# Patient Record
Sex: MALE | Race: WHITE | NOT HISPANIC OR LATINO | Employment: OTHER | URBAN - METROPOLITAN AREA
[De-identification: names, ages, dates, MRNs, and addresses within clinical notes are randomized per-mention and may not be internally consistent; named-entity substitution may affect disease eponyms.]

---

## 2019-03-20 ENCOUNTER — HOSPITAL ENCOUNTER (INPATIENT)
Facility: HOSPITAL | Age: 84
LOS: 6 days | Discharge: HOSPICE | DRG: 640 | End: 2019-03-26
Attending: EMERGENCY MEDICINE | Admitting: INTERNAL MEDICINE
Payer: MEDICARE

## 2019-03-20 DIAGNOSIS — R41.82 ALTERED MENTAL STATUS, UNSPECIFIED ALTERED MENTAL STATUS TYPE: ICD-10-CM

## 2019-03-20 DIAGNOSIS — N28.9 ACUTE ON CHRONIC RENAL INSUFFICIENCY: ICD-10-CM

## 2019-03-20 DIAGNOSIS — N39.0 CHRONIC UTI: ICD-10-CM

## 2019-03-20 DIAGNOSIS — N18.9 ACUTE ON CHRONIC RENAL INSUFFICIENCY: ICD-10-CM

## 2019-03-20 DIAGNOSIS — F01.50 VASCULAR DEMENTIA WITHOUT BEHAVIORAL DISTURBANCE: ICD-10-CM

## 2019-03-20 DIAGNOSIS — E87.0 ACUTE HYPERNATREMIA: Primary | ICD-10-CM

## 2019-03-20 DIAGNOSIS — R41.82 ALTERED MENTAL STATUS: ICD-10-CM

## 2019-03-20 DIAGNOSIS — E87.0 HYPERNATREMIA: ICD-10-CM

## 2019-03-20 DIAGNOSIS — N17.9 AKI (ACUTE KIDNEY INJURY): ICD-10-CM

## 2019-03-20 PROBLEM — D72.829 LEUCOCYTOSIS: Status: ACTIVE | Noted: 2019-03-20

## 2019-03-20 LAB
ALBUMIN SERPL BCP-MCNC: 3 G/DL
ALP SERPL-CCNC: 119 U/L
ALT SERPL W/O P-5'-P-CCNC: 63 U/L
AMORPH CRY URNS QL MICRO: ABNORMAL
ANION GAP SERPL CALC-SCNC: ABNORMAL MMOL/L
ANISOCYTOSIS BLD QL SMEAR: SLIGHT
APTT BLDCRRT: 24.2 SEC
AST SERPL-CCNC: 63 U/L
BACTERIA #/AREA URNS HPF: ABNORMAL /HPF
BASOPHILS # BLD AUTO: 0.03 K/UL
BASOPHILS NFR BLD: 0.2 %
BILIRUB SERPL-MCNC: 0.5 MG/DL
BILIRUB UR QL STRIP: ABNORMAL
BUN SERPL-MCNC: 108 MG/DL
CALCIUM SERPL-MCNC: 8.8 MG/DL
CHLORIDE SERPL-SCNC: >130 MMOL/L
CLARITY UR: CLEAR
CO2 SERPL-SCNC: 21 MMOL/L
COLOR UR: YELLOW
CREAT SERPL-MCNC: 3.1 MG/DL
DIFFERENTIAL METHOD: ABNORMAL
EOSINOPHIL # BLD AUTO: 0 K/UL
EOSINOPHIL NFR BLD: 0.1 %
ERYTHROCYTE [DISTWIDTH] IN BLOOD BY AUTOMATED COUNT: 16.1 %
EST. GFR  (AFRICAN AMERICAN): 21 ML/MIN/1.73 M^2
EST. GFR  (NON AFRICAN AMERICAN): 18 ML/MIN/1.73 M^2
GLUCOSE SERPL-MCNC: 129 MG/DL
GLUCOSE UR QL STRIP: NEGATIVE
GRAN CASTS #/AREA URNS LPF: 1 /LPF
HCT VFR BLD AUTO: 50.3 %
HGB BLD-MCNC: 14.7 G/DL
HGB UR QL STRIP: ABNORMAL
HYALINE CASTS #/AREA URNS LPF: 1 /LPF
INFLUENZA A, MOLECULAR: NEGATIVE
INFLUENZA B, MOLECULAR: NEGATIVE
INR PPP: 1.1
KETONES UR QL STRIP: NEGATIVE
LACTATE SERPL-SCNC: 3 MMOL/L
LACTATE SERPL-SCNC: 3 MMOL/L
LEUKOCYTE ESTERASE UR QL STRIP: ABNORMAL
LYMPHOCYTES # BLD AUTO: 1.8 K/UL
LYMPHOCYTES NFR BLD: 12.8 %
MAGNESIUM SERPL-MCNC: 2.8 MG/DL
MCH RBC QN AUTO: 27.6 PG
MCHC RBC AUTO-ENTMCNC: 29.2 G/DL
MCV RBC AUTO: 95 FL
MICROSCOPIC COMMENT: ABNORMAL
MONOCYTES # BLD AUTO: 1.7 K/UL
MONOCYTES NFR BLD: 12.1 %
NEUTROPHILS # BLD AUTO: 10.7 K/UL
NEUTROPHILS NFR BLD: 75.4 %
NITRITE UR QL STRIP: NEGATIVE
OVALOCYTES BLD QL SMEAR: ABNORMAL
PH UR STRIP: 6 [PH] (ref 5–8)
PLATELET # BLD AUTO: 295 K/UL
PLATELET BLD QL SMEAR: ABNORMAL
PMV BLD AUTO: 13.2 FL
POIKILOCYTOSIS BLD QL SMEAR: SLIGHT
POTASSIUM SERPL-SCNC: 4.5 MMOL/L
PROCALCITONIN SERPL IA-MCNC: 0.2 NG/ML
PROT SERPL-MCNC: 7.3 G/DL
PROT UR QL STRIP: ABNORMAL
PROTHROMBIN TIME: 12 SEC
RBC # BLD AUTO: 5.32 M/UL
RBC #/AREA URNS HPF: 5 /HPF (ref 0–4)
SODIUM SERPL-SCNC: 175 MMOL/L
SP GR UR STRIP: 1.02 (ref 1–1.03)
SPECIMEN SOURCE: NORMAL
SPHEROCYTES BLD QL SMEAR: ABNORMAL
TARGETS BLD QL SMEAR: ABNORMAL
TROPONIN I SERPL DL<=0.01 NG/ML-MCNC: 0.06 NG/ML
URN SPEC COLLECT METH UR: ABNORMAL
UROBILINOGEN UR STRIP-ACNC: NEGATIVE EU/DL
WBC # BLD AUTO: 14.25 K/UL
WBC #/AREA URNS HPF: 13 /HPF (ref 0–5)

## 2019-03-20 PROCEDURE — 84484 ASSAY OF TROPONIN QUANT: CPT

## 2019-03-20 PROCEDURE — 93010 EKG 12-LEAD: ICD-10-PCS | Mod: ,,, | Performed by: INTERNAL MEDICINE

## 2019-03-20 PROCEDURE — 96365 THER/PROPH/DIAG IV INF INIT: CPT

## 2019-03-20 PROCEDURE — 25000003 PHARM REV CODE 250: Performed by: EMERGENCY MEDICINE

## 2019-03-20 PROCEDURE — C1751 CATH, INF, PER/CENT/MIDLINE: HCPCS

## 2019-03-20 PROCEDURE — 80053 COMPREHEN METABOLIC PANEL: CPT

## 2019-03-20 PROCEDURE — 85025 COMPLETE CBC W/AUTO DIFF WBC: CPT

## 2019-03-20 PROCEDURE — 63600175 PHARM REV CODE 636 W HCPCS: Performed by: EMERGENCY MEDICINE

## 2019-03-20 PROCEDURE — 85730 THROMBOPLASTIN TIME PARTIAL: CPT

## 2019-03-20 PROCEDURE — 99223 PR INITIAL HOSPITAL CARE,LEVL III: ICD-10-PCS | Mod: ,,, | Performed by: INTERNAL MEDICINE

## 2019-03-20 PROCEDURE — 99285 EMERGENCY DEPT VISIT HI MDM: CPT | Mod: 25

## 2019-03-20 PROCEDURE — 83735 ASSAY OF MAGNESIUM: CPT

## 2019-03-20 PROCEDURE — S5010 5% DEXTROSE AND 0.45% SALINE: HCPCS | Performed by: NURSE PRACTITIONER

## 2019-03-20 PROCEDURE — 99223 1ST HOSP IP/OBS HIGH 75: CPT | Mod: ,,, | Performed by: INTERNAL MEDICINE

## 2019-03-20 PROCEDURE — 25000003 PHARM REV CODE 250: Performed by: NURSE PRACTITIONER

## 2019-03-20 PROCEDURE — 36415 COLL VENOUS BLD VENIPUNCTURE: CPT

## 2019-03-20 PROCEDURE — 85610 PROTHROMBIN TIME: CPT

## 2019-03-20 PROCEDURE — 84145 PROCALCITONIN (PCT): CPT

## 2019-03-20 PROCEDURE — 21400001 HC TELEMETRY ROOM

## 2019-03-20 PROCEDURE — 87040 BLOOD CULTURE FOR BACTERIA: CPT

## 2019-03-20 PROCEDURE — 87086 URINE CULTURE/COLONY COUNT: CPT

## 2019-03-20 PROCEDURE — 81000 URINALYSIS NONAUTO W/SCOPE: CPT

## 2019-03-20 PROCEDURE — 80048 BASIC METABOLIC PNL TOTAL CA: CPT

## 2019-03-20 PROCEDURE — 83605 ASSAY OF LACTIC ACID: CPT | Mod: 91

## 2019-03-20 PROCEDURE — 36410 VNPNXR 3YR/> PHY/QHP DX/THER: CPT | Mod: 52

## 2019-03-20 PROCEDURE — 93010 ELECTROCARDIOGRAM REPORT: CPT | Mod: ,,, | Performed by: INTERNAL MEDICINE

## 2019-03-20 PROCEDURE — 87502 INFLUENZA DNA AMP PROBE: CPT

## 2019-03-20 RX ORDER — DEXTROSE MONOHYDRATE AND SODIUM CHLORIDE 5; .45 G/100ML; G/100ML
INJECTION, SOLUTION INTRAVENOUS CONTINUOUS
Status: DISCONTINUED | OUTPATIENT
Start: 2019-03-20 | End: 2019-03-20

## 2019-03-20 RX ORDER — CEFEPIME HYDROCHLORIDE 2 G/50ML
2 INJECTION, SOLUTION INTRAVENOUS
Status: DISCONTINUED | OUTPATIENT
Start: 2019-03-21 | End: 2019-03-21

## 2019-03-20 RX ORDER — MEROPENEM AND SODIUM CHLORIDE 1 G/50ML
1 INJECTION, SOLUTION INTRAVENOUS
Status: DISCONTINUED | OUTPATIENT
Start: 2019-03-20 | End: 2019-03-20

## 2019-03-20 RX ADMIN — SODIUM CHLORIDE 2000 ML: 0.9 INJECTION, SOLUTION INTRAVENOUS at 02:03

## 2019-03-20 RX ADMIN — CEFEPIME HYDROCHLORIDE 2 G: 2 INJECTION, POWDER, FOR SOLUTION INTRAVENOUS at 02:03

## 2019-03-20 RX ADMIN — DEXTROSE AND SODIUM CHLORIDE: 5; .45 INJECTION, SOLUTION INTRAVENOUS at 10:03

## 2019-03-20 NOTE — H&P
[facility] Hospital Medicine  History & Physical    Patient Name: David Montero  MRN: [id number]  Admission Date: 3/20/2019  Attending Physician: David Sidhu MD  Primary Care Provider: Steve Weeks MD         Patient information was obtained from past medical records and ER records.     Subjective:     Principal Problem:<principal problem not specified>    Chief Complaint:   Chief Complaint   Patient presents with    Altered Mental Status     sent from [facility], operated by [facility] via [facility] for new onset AMS since abdominal aneurysm on 3/5/19. pt nonverbal, but responsive to touch.         HPI:   Patient has altered mental status -history from electronic chart .  a 83 y.o. male patient with history of CAD, AAA, Alzheimer's Dementia, chronic UTI, HTN, presents to the Emergency Department for AMS.  He is a resident of   [facility] [place], operated by [facility].     He had recent serum sodium done at [facility]-  3/6/2019 3/5/2019 3/4/2019 3/3/2019 3/2/2019 3/1/2019 3/1/2019 2/28/2019 2/28/2019 2/27/2019 2/27/2019 2/27/2019 2/26/2019 12/18/2018 12/17/2018 12/17/2018 12/16/2018 12/16/2018 12/15/2018 12/15/2018 12/14/2018 12/14/2018 12/13/2018 12/13/2018 12/12/2018 11/6/2018 9/3/2018 6/4/2018 3/2/2018 3/1/2018 2/28/2018 2/27/2018 2/26/2018 2/12/2018 12/20/2017 12/15/2017 12/1/2017 9/1/2017 6/1/2017 3/7/2017 5/15/2015 5/14/2015 5/13/2015     140 140 137 140 139     Since admission , serum sodium is 175. He is non verbal   Head CT scan -   There is no evidence of an acute ischemic event.  Previous records from NH- he has no family and is full code.      Past Medical History:   Diagnosis Date    Alzheimer's dementia     CAD (coronary artery disease)     Cancer 2009    bladder    Hyperlipidemia     Hypertension        Past Surgical History:   Procedure Laterality Date    APPENDECTOMY  child    broken leg  1970    leg    CARDIAC CATHETERIZATION      CAROTID ENDARTERECTOMY  2009    cataract surgery  10 yrs ago    CORONARY ARTERY BYPASS GRAFT  2009     CORONARY STENT PLACEMENT  2002    HERNIA REPAIR  age 19    tonsillectomy  age 11    VASCULAR SURGERY  2006       Review of patient's allergies indicates:   Allergen Reactions    Augmentin [amoxicillin-pot clavulanate]      Tolerates cephalosporins       No current facility-administered medications on file prior to encounter.      Current Outpatient Medications on File Prior to Encounter   Medication Sig    acetaminophen (TYLENOL) 325 MG tablet Take 650 mg by mouth every 4 (four) hours as needed for Pain.    cycloSPORINE (RESTASIS) 0.05 % ophthalmic emulsion Place 1 drop into both eyes 2 (two) times daily.    divalproex (DEPAKOTE) 250 MG EC tablet Take 500 mg by mouth nightly.     donepezil (ARICEPT) 5 MG tablet Take 5 mg by mouth every evening.    dutasteride (AVODART) 0.5 mg capsule 1 Capsule(s) Oral PRN Every day.      dutasteride (AVODART) 0.5 mg capsule Take by mouth. 1 Capsule Oral Every day    iron, carbonyl (FEOSOL) 45 mg Tab Take by mouth. 1 Tablet Oral Every day    memantine (NAMENDA) 5 MG Tab Take 5 mg by mouth 2 (two) times daily.    metoprolol succinate (TOPROL-XL) 25 MG 24 hr tablet Take 25 mg by mouth once daily.    MULTIVIT,THER IRON,CA,FA & MIN (MULTIVITAMIN) Tab Take 1 tablet by mouth once daily.    nitroGLYCERIN (NITROSTAT) 0.4 MG SL tablet Place under the tongue. 1 Tablet, Sublingual Sublingual As directed    olanzapine (ZYPREXA) 5 MG tablet Take 5 mg by mouth every evening.    pravastatin (PRAVACHOL) 40 MG tablet Take by mouth. 1 Tablet Oral Every day    simvastatin (ZOCOR) 40 MG tablet Take 40 mg by mouth every evening.    tamsulosin (FLOMAX) 0.4 mg Cp24 Take 0.4 mg by mouth once daily.    venlafaxine (EFFEXOR) 75 MG tablet Take 150 mg by mouth once daily. Takes effexor 75mg with effexor 150 mg to equal 225 mg po q am     Family History     None        Tobacco Use    Smoking status: Current Every Day Smoker     Packs/day: 0.50     Years: 62.00     Pack years: 31.00      Types: Cigarettes   Substance and Sexual Activity    Alcohol use: No     Alcohol/week: 0.0 oz    Drug use: No    Sexual activity: Not on file     Review of Systems   Unable to perform ROS: Acuity of condition     Objective:     Vital Signs (Most Recent):  Temp: 97.7 °F (36.5 °C) (03/20/19 1754)  Pulse: 80 (03/20/19 1801)  Resp: 20 (03/20/19 1801)  BP: (!) 150/74 (03/20/19 1801)  SpO2: 99 % (03/20/19 1801) Vital Signs (24h Range):  Temp:  [97.7 °F (36.5 °C)-98.1 °F (36.7 °C)] 97.7 °F (36.5 °C)  Pulse:  [80-93] 80  Resp:  [18-29] 20  SpO2:  [98 %-100 %] 99 %  BP: (135-157)/(74-86) 150/74     Weight: 68.9 kg (152 lb)  Body mass index is 23.81 kg/m².    Physical Exam   Constitutional:   Cachetic, non verbal   HENT:   Head: Atraumatic.   Right Ear: External ear normal.   Left Ear: External ear normal.   Mouth/Throat: Oropharynx is clear and moist.   Eyes: EOM are normal. Pupils are equal, round, and reactive to light.   Neck: Normal range of motion. Neck supple.   Cardiovascular: Normal rate and regular rhythm.   Pulmonary/Chest: Effort normal and breath sounds normal.   Abdominal: Soft. Bowel sounds are normal.   Musculoskeletal: He exhibits no edema.   Contracted    Neurological:   No verbal    Skin: Skin is warm and dry.   Psychiatric:   Non verbal    Nursing note and vitals reviewed.        CRANIAL NERVES     CN III, IV, VI   Pupils are equal, round, and reactive to light.  Extraocular motions are normal.        Significant Labs:   Blood Culture: No results for input(s): LABBLOO in the last 48 hours.  BMP:   Recent Labs   Lab 03/20/19  1528   *   *   K 4.5   CL >130*   CO2 21*   *   CREATININE 3.1*   CALCIUM 8.8   MG 2.8*     CBC:   Recent Labs   Lab 03/20/19  1333   WBC 14.25*   HGB 14.7   HCT 50.3        CMP:   Recent Labs   Lab 03/20/19  1528   *   K 4.5   CL >130*   CO2 21*   *   *   CREATININE 3.1*   CALCIUM 8.8   PROT 7.3   ALBUMIN 3.0*   BILITOT 0.5   ALKPHOS  119   AST 63*   ALT 63*   ANIONGAP Unable to calculate   EGFRNONAA 18*     Magnesium:   Recent Labs   Lab 03/20/19  1528   MG 2.8*     Urine Studies:   Recent Labs   Lab 03/20/19  1342   COLORU Yellow   APPEARANCEUA Clear   PHUR 6.0   SPECGRAV 1.025   PROTEINUA 1+*   GLUCUA Negative   KETONESU Negative   BILIRUBINUA 1+*   OCCULTUA 3+*   NITRITE Negative   UROBILINOGEN Negative   LEUKOCYTESUR 1+*   RBCUA 5*   WBCUA 13*   BACTERIA Few*   HYALINECASTS 1     All pertinent labs within the past 24 hours have been reviewed.    Significant Imaging: I have reviewed and interpreted all pertinent imaging results/findings within the past 24 hours.    Assessment/Plan:     Leucocytosis      Will follow cultures to guide therapy , on cefepime     ARIADNA (acute kidney injury)      Likely pre renal, will continue hydration , needs close monitoring of serum creatine        Acute hypernatremia      Will use D5 water ,  Nephrology consult .  Will correct water deficit .  Will need close monitoring to over over rapid correction.  Serum sodium was normal on 03.06       Dementia      Supportive, needs to be DNR, has no family , will plan to do physician directed DNR      Bladder cancer      Continue supportive care ,out patient follow up        VTE Risk Mitigation (From admission, onward)    None             David Sidhu MD  Department of Hospital Medicine   [facility]

## 2019-03-20 NOTE — ASSESSMENT & PLAN NOTE
Patient presents with severe hypernatremia and Na of 175. Na was 140 on 3/6/19. Hypernatremia likely a result of dehydration which is also consistent with physical exam.  Water deficient is about 8 liters. Patient received 2 liter NS bolus in ER (following sepsis protocol). Will start patient on 1/4 NS at 75 cc/hr and recheck Na in am.

## 2019-03-20 NOTE — HPI
Patient has altered mental status -history from electronic chart .  a 83 y.o. male patient with history of CAD, AAA, Alzheimer's Dementia, chronic UTI, HTN, presents to the Emergency Department for AMS. He is a resident of   [facility].     He had recent serum sodium done at [facility]-  3/6/2019 3/5/2019 3/4/2019 3/3/2019 3/2/2019 3/1/2019 3/1/2019 2/28/2019 2/28/2019 2/27/2019 2/27/2019 2/27/2019 2/26/2019 12/18/2018 12/17/2018 12/17/2018 12/16/2018 12/16/2018 12/15/2018 12/15/2018 12/14/2018 12/14/2018 12/13/2018 12/13/2018 12/12/2018 11/6/2018 9/3/2018 6/4/2018 3/2/2018 3/1/2018 2/28/2018 2/27/2018 2/26/2018 2/12/2018 12/20/2017 12/15/2017 12/1/2017 9/1/2017 6/1/2017 3/7/2017 5/15/2015 5/14/2015 5/13/2015     140 140 137 140 139     Since admission , serum sodium is 175. He is non verbal   Head CT scan -   There is no evidence of an acute ischemic event.  Previous records from NH- he has no family and is full code.

## 2019-03-20 NOTE — SUBJECTIVE & OBJECTIVE
Past Medical History:   Diagnosis Date    Alzheimer's dementia     CAD (coronary artery disease)     Cancer 2009    bladder    Hyperlipidemia     Hypertension        Past Surgical History:   Procedure Laterality Date    APPENDECTOMY  child    broken leg  1970    leg    CARDIAC CATHETERIZATION      CAROTID ENDARTERECTOMY  2009    cataract surgery  10 yrs ago    CORONARY ARTERY BYPASS GRAFT  2009    CORONARY STENT PLACEMENT  2002    HERNIA REPAIR  age 19    tonsillectomy  age 11    VASCULAR SURGERY  2006       Review of patient's allergies indicates:   Allergen Reactions    Augmentin [amoxicillin-pot clavulanate]      Tolerates cephalosporins     Current Facility-Administered Medications   Medication Frequency    [START ON 3/21/2019] ceFEPIme in dextrose 5% 2 gram/50 mL IVPB 2 g Q24H     Current Outpatient Medications   Medication    acetaminophen (TYLENOL) 325 MG tablet    cycloSPORINE (RESTASIS) 0.05 % ophthalmic emulsion    divalproex (DEPAKOTE) 250 MG EC tablet    donepezil (ARICEPT) 5 MG tablet    dutasteride (AVODART) 0.5 mg capsule    dutasteride (AVODART) 0.5 mg capsule    iron, carbonyl (FEOSOL) 45 mg Tab    memantine (NAMENDA) 5 MG Tab    metoprolol succinate (TOPROL-XL) 25 MG 24 hr tablet    MULTIVIT,THER IRON,CA,FA & MIN (MULTIVITAMIN) Tab    nitroGLYCERIN (NITROSTAT) 0.4 MG SL tablet    olanzapine (ZYPREXA) 5 MG tablet    pravastatin (PRAVACHOL) 40 MG tablet    simvastatin (ZOCOR) 40 MG tablet    tamsulosin (FLOMAX) 0.4 mg Cp24    venlafaxine (EFFEXOR) 75 MG tablet     Family History     None        Tobacco Use    Smoking status: Current Every Day Smoker     Packs/day: 0.50     Years: 62.00     Pack years: 31.00     Types: Cigarettes   Substance and Sexual Activity    Alcohol use: No     Alcohol/week: 0.0 oz    Drug use: No    Sexual activity: Not on file     Review of Systems   Unable to perform ROS: Dementia     Objective:     Vital Signs (Most Recent):  Temp: 98.1  °F (36.7 °C) (03/20/19 1204)  Pulse: 85 (03/20/19 1529)  Resp: 18 (03/20/19 1529)  BP: (!) 149/75 (03/20/19 1529)  SpO2: 100 % (03/20/19 1529)  O2 Device (Oxygen Therapy): room air (03/20/19 1212) Vital Signs (24h Range):  Temp:  [98.1 °F (36.7 °C)] 98.1 °F (36.7 °C)  Pulse:  [84-93] 85  Resp:  [18-29] 18  SpO2:  [98 %-100 %] 100 %  BP: (135-157)/(75-86) 149/75     Weight: 68.9 kg (152 lb) (03/20/19 1204)  Body mass index is 23.81 kg/m².  Body surface area is 1.8 meters squared.    No intake/output data recorded.    Physical Exam   Constitutional: He appears well-developed.   HENT:   Head: Normocephalic.   Nose: No rhinorrhea.   Mouth/Throat: Mucous membranes are dry. No oropharyngeal exudate.   Dry lips.    Neck: No thyroid mass present.   Cardiovascular: Normal rate, regular rhythm, S1 normal, S2 normal and intact distal pulses.   Pulmonary/Chest: Effort normal. No respiratory distress. He has no wheezes.   Abdominal: Soft. Bowel sounds are normal. He exhibits no distension. There is no tenderness. No hernia.   Lymphadenopathy:     He has no cervical adenopathy.   Neurological: He is alert.   Skin: Skin is warm and dry.       Significant Labs:  Lab Results   Component Value Date    CREATININE 3.1 (H) 03/20/2019     (H) 03/20/2019     (HH) 03/20/2019    K 4.5 03/20/2019    CL >130 (HH) 03/20/2019    CO2 21 (L) 03/20/2019     Lab Results   Component Value Date    CALCIUM 8.8 03/20/2019     Lab Results   Component Value Date    ALBUMIN 3.0 (L) 03/20/2019     Lab Results   Component Value Date    WBC 14.25 (H) 03/20/2019    HGB 14.7 03/20/2019    HCT 50.3 03/20/2019    MCV 95 03/20/2019     03/20/2019     Recent Labs   Lab 03/20/19  1528   MG 2.8*     Urinalysis  Recent Labs   Lab 03/20/19  1342   COLORU Yellow   SPECGRAV 1.025   PHUR 6.0   PROTEINUA 1+*   BACTERIA Few*   NITRITE Negative   LEUKOCYTESUR 1+*   UROBILINOGEN Negative   HYALINECASTS 1         Significant Imaging:  Imaging Results           CT Head Without Contrast (Final result)  Result time 03/20/19 14:30:08    Final result by REAGAN Gaxiola Sr., MD (03/20/19 14:30:08)                 Impression:      1. There are chronic appearing ischemic changes in the deep white matter of both cerebral hemispheres. There is no evidence of an acute ischemic event.  2. There is no intracranial hemorrhage.  3. There is mild partial opacification of the posterior aspect of the left ethmoidal sinus.  This is characteristic of sinusitis.  4. There is minimal partial opacification of the mastoid air cells bilaterally.  All CT scans at this facility use dose modulation, iterative reconstruction, and/or weight base dosing when appropriate to reduce radiation dose when appropriate to reduce radiation dose to as low as reasonably achievable.      Electronically signed by: Kunal Gaxiola MD  Date:    03/20/2019  Time:    14:30             Narrative:    EXAMINATION:  CT HEAD WITHOUT CONTRAST    CLINICAL HISTORY:  Confusion/delirium, altered LOC, unexplained;    TECHNIQUE:  Standard brain CT protocol without IV contrast was performed.    COMPARISON:  None    FINDINGS:  There are chronic appearing ischemic changes in the deep white matter of both cerebral hemispheres.  There is no evidence of an acute ischemic event.  There is no intracranial hemorrhage.  There is no calvarial fracture.  There is mild partial opacification of the posterior aspect of the left ethmoidal sinus.  There is minimal partial opacification of the mastoid air cells bilaterally.                               X-Ray Chest AP Portable (Final result)  Result time 03/20/19 12:50:27    Final result by REAGAN Gaxiola Sr., MD (03/20/19 12:50:27)                 Impression:      1. This is a limited examination secondary to the patient being rotated to the left.  2. There is no focal pulmonary infiltrate visualized.  3. Surgical changes  .      Electronically signed by: Kunal Gaxiola  MD  Date:    03/20/2019  Time:    12:50             Narrative:    EXAMINATION:  XR CHEST AP PORTABLE    CLINICAL HISTORY:  Sepsis;    COMPARISON:  None    FINDINGS:  This is a limited examination secondary to the patient being rotated to the left.  There are multiple sternotomy wires in place.  There are multiple surgical clips projected over the mediastinum.  The size of the heart is normal.  There is no focal pulmonary infiltrate visualized.  There is no pneumothorax.  The costophrenic angles are sharp.

## 2019-03-20 NOTE — ASSESSMENT & PLAN NOTE
Will use D5 water ,  Nephrology consult .  Will correct water deficit .  Will need close monitoring to over over rapid correction.  Serum sodium was normal on 03.06

## 2019-03-20 NOTE — ASSESSMENT & PLAN NOTE
Due to dehydration (see hypernatremia above). Creatinine was 1.1 on 3/6/19. Will hydrate patient with 1/4 NS at 75 cc/hr.

## 2019-03-20 NOTE — HPI
"83 year old male with dementia, CAD, h/o bladder cancer, HTN, hyperlipidemia, BPH, femoral artery aneurysm, AAA, GERD, s/p CVA, chronic UTI [place] [facility] [place] "altered mental status". Patient was transferred from [facility]. Work-up revealed hypernatremia (Na 172) and ARIADNA (creatinine has increased from 1.1 on 3/6/19 to 3.1 on 3/20/19). Also with leucocytosis (WBC 14.2). He received 2 liters of IV fluids in ER.   Nephrology was consulted to help with patient's renal and electrolyte care. Patient was seen and examined in his hospital room. Patient is demented and currently nonverbal. He is not able to provide any additional history.   Chart review revealed that patient is seen at [facility]. Labs from 3/6/19 revealed Na of 140, K-5.3, CO2 of 18, creatinine of 1.1, Calcium of 9.2, WBC of 6.1, Hgb of 14.3, platelets of 262. Urinalysis from 2/26/19 showed 100 protein, > 100 WBC, 5-10 RBC, + LE. ECHO from 3/2/19 revealed EF of 48%.   "

## 2019-03-20 NOTE — ED PROVIDER NOTES
SCRIBE #1 NOTE: I, Jacqueline Gil/Fannie Gary, am scribing for, and in the presence of, Humera Solis MD. I have scribed the HPI, ROS, and PEx.     SCRIBE #2 NOTE: I, Natalia Deutsch, am scribing for, and in the presence of,  Laurence Solis MD. I have scribed the remaining portions of the note not scribed by Scribe #1.     History      Chief Complaint   Patient presents with    Altered Mental Status     sent from [facility] via \[facility]\"" for new onset AMS since abdominal aneurysm on 3/5/19. pt nonverbal, but responsive to touch.        Review of patient's allergies indicates:   Allergen Reactions    Augmentin [amoxicillin-pot clavulanate]      Tolerates cephalosporins        HPI   HPI    3/20/2019, 12:21 PM   [facility]  HPI limited to AMS      History of Present Illness: David Montero is a 83 y.o. male patient who a PMHx of CAD, AAA, Alzheimer's Dementia, chronic UTI, HTN, presents to the Emergency Department for AMS. Pt lives at [facility]. The staff was concerned because he was more altered than usual. Pt has been evaluated for similar symptoms multiple times in the past at [facility]. Pt is non-verbal. Pt does not deny any symptoms at this time. No further complaints.    Arrival mode:  \[facility]\""    PCP: Steve Weeks MD       Past Medical History:  Past Medical History:   Diagnosis Date    Alzheimer's dementia     CAD (coronary artery disease)     Cancer 2009    bladder    Hyperlipidemia     Hypertension        Past Surgical History:  Past Surgical History:   Procedure Laterality Date    APPENDECTOMY  child    broken leg  1970    leg    CARDIAC CATHETERIZATION      CAROTID ENDARTERECTOMY  2009    cataract surgery  10 yrs ago    CORONARY ARTERY BYPASS GRAFT  2009    CORONARY STENT PLACEMENT  2002    HERNIA REPAIR  age 19    tonsillectomy  age 11    VASCULAR SURGERY  2006         Family History:  History reviewed. No pertinent family history.    Social  History:  Social History     Tobacco Use    Smoking status: Current Every Day Smoker     Packs/day: 0.50     Years: 62.00     Pack years: 31.00     Types: Cigarettes   Substance and Sexual Activity    Alcohol use: No     Alcohol/week: 0.0 oz    Drug use: No    Sexual activity: Unknown       ROS   Review of Systems   Unable to perform ROS: Mental status change     Physical Exam      Initial Vitals   BP Pulse Resp Temp SpO2   03/20/19 1159 03/20/19 1204 03/20/19 1204 03/20/19 1204 03/20/19 1212   (!) 157/86 93 20 98.1 °F (36.7 °C) 98 %      MAP       --                 Physical Exam  Nursing Notes and Vital Signs Reviewed.  Constitutional: Patient is in no acute distress. Elderly, fragile and chronic ill appearing. Lying in fetal position. Pt has a diaper on. Non-verbal.  Head: Atraumatic. Normocephalic.  Eyes: PERRL. EOM intact. Conjunctivae are not pale. No scleral icterus.  ENT: Mucous membranes are dry. Oropharynx is clear and symmetric.    Neck: Supple. Full ROM. No lymphadenopathy.  Cardiovascular: Regular rate. Regular rhythm. No murmurs, rubs, or gallops. Distal pulses are 2+ and symmetric.  Pulmonary/Chest: No respiratory distress. Clear to auscultation bilaterally. No wheezing or rales.  Abdominal: Soft and non-distended.  There is no tenderness.  No rebound, guarding, or rigidity. Good bowel sounds.  Genitourinary: No CVA tenderness  Musculoskeletal: Moves all extremities. No obvious deformities. No edema. No calf tenderness.  Skin: Warm and dry.  Neurological: Demented. Able to  hands on command.  Psychiatric: Confused. Disoriented.    ED Course    Critical Care  Date/Time: 3/20/2019 3:26 PM  Performed by: Humera Solis MD  Authorized by: Humera Solis MD   Direct patient critical care time: 15 minutes  Ordering / reviewing critical care time: 15 minutes  Documentation critical care time: 15 minutes  Total critical care time (exclusive of procedural time) : 45 minutes  Critical care  time was exclusive of separately billable procedures and treating other patients and teaching time.  Critical care was necessary to treat or prevent imminent or life-threatening deterioration of the following conditions: sepsis.  Critical care was time spent personally by me on the following activities: blood draw for specimens, interpretation of cardiac output measurements, evaluation of patient's response to treatment, examination of patient, obtaining history from patient or surrogate, ordering and performing treatments and interventions, ordering and review of laboratory studies, ordering and review of radiographic studies, pulse oximetry, re-evaluation of patient's condition and review of old charts.        ED Vital Signs:  Vitals:    03/20/19 1232 03/20/19 1240 03/20/19 1504 03/20/19 1506   BP:   135/77 135/77   Pulse: 91 91 84 85   Resp:  (!) 29 18    Temp:       TempSrc:       SpO2:  98% 100% 98%   Weight:       Height:        03/20/19 1529 03/20/19 1754 03/20/19 1801 03/20/19 1901   BP: (!) 149/75  (!) 150/74 (!) 180/84   Pulse: 85  80 83   Resp: 18  20 (!) 24   Temp:  97.7 °F (36.5 °C)     TempSrc:       SpO2: 100%  99% 98%   Weight:       Height:        03/20/19 1931 03/20/19 2001 03/20/19 2051 03/20/19 2100   BP: (!) 170/80 (!) 159/107 131/74    Pulse: 89 91 88 88   Resp: (!) 29 (!) 30 20    Temp:   98.3 °F (36.8 °C)    TempSrc:   Axillary    SpO2: 100% 99% 96%    Weight:       Height:        03/20/19 2300 03/20/19 2333 03/21/19 0100   BP:  (!) 147/71    Pulse: 84 84 85   Resp:  (!) 28    Temp:  97.4 °F (36.3 °C)    TempSrc:      SpO2:  (!) 94%    Weight:      Height:          Abnormal Lab Results:  Labs Reviewed   CBC W/ AUTO DIFFERENTIAL - Abnormal; Notable for the following components:       Result Value    WBC 14.25 (*)     MCHC 29.2 (*)     RDW 16.1 (*)     MPV 13.2 (*)     Gran # (ANC) 10.7 (*)     Mono # 1.7 (*)     Gran% 75.4 (*)     Lymph% 12.8 (*)     All other components within normal limits    LACTIC ACID, PLASMA - Abnormal; Notable for the following components:    Lactate (Lactic Acid) 3.0 (*)     All other components within normal limits   URINALYSIS, REFLEX TO URINE CULTURE - Abnormal; Notable for the following components:    Protein, UA 1+ (*)     Bilirubin (UA) 1+ (*)     Occult Blood UA 3+ (*)     Leukocytes, UA 1+ (*)     All other components within normal limits    Narrative:     Preferred Collection Type->Urine, Catheterized   URINALYSIS MICROSCOPIC - Abnormal; Notable for the following components:    RBC, UA 5 (*)     WBC, UA 13 (*)     Bacteria, UA Few (*)     Granular Casts, UA 1 (*)     All other components within normal limits    Narrative:     Preferred Collection Type->Urine, Catheterized   MAGNESIUM - Abnormal; Notable for the following components:    Magnesium 2.8 (*)     All other components within normal limits   TROPONIN I - Abnormal; Notable for the following components:    Troponin I 0.064 (*)     All other components within normal limits   COMPREHENSIVE METABOLIC PANEL - Abnormal; Notable for the following components:    Sodium 175 (*)     Chloride >130 (*)     CO2 21 (*)     Glucose 129 (*)     BUN, Bld 108 (*)     Creatinine 3.1 (*)     Albumin 3.0 (*)     AST 63 (*)     ALT 63 (*)     eGFR if  21 (*)     eGFR if non  18 (*)     All other components within normal limits    Narrative:       NA & CL critical result(s) called and verbal readback obtained from   Estephania pulido rn, 03/20/2019 16:33   LACTIC ACID, PLASMA - Abnormal; Notable for the following components:    Lactate (Lactic Acid) 3.0 (*)     All other components within normal limits   INFLUENZA A & B BY MOLECULAR   CULTURE, URINE   PROTIME-INR   APTT   PROCALCITONIN        All Lab Results:  Results for orders placed or performed during the hospital encounter of 03/20/19   Influenza A & B by Molecular   Result Value Ref Range    Influenza A, Molecular Negative Negative    Influenza B,  Molecular Negative Negative    Flu A & B Source Nasal swab    CBC auto differential   Result Value Ref Range    WBC 14.25 (H) 3.90 - 12.70 K/uL    RBC 5.32 4.60 - 6.20 M/uL    Hemoglobin 14.7 14.0 - 18.0 g/dL    Hematocrit 50.3 40.0 - 54.0 %    MCV 95 82 - 98 fL    MCH 27.6 27.0 - 31.0 pg    MCHC 29.2 (L) 32.0 - 36.0 g/dL    RDW 16.1 (H) 11.5 - 14.5 %    Platelets 295 150 - 350 K/uL    MPV 13.2 (H) 9.2 - 12.9 fL    Gran # (ANC) 10.7 (H) 1.8 - 7.7 K/uL    Lymph # 1.8 1.0 - 4.8 K/uL    Mono # 1.7 (H) 0.3 - 1.0 K/uL    Eos # 0.0 0.0 - 0.5 K/uL    Baso # 0.03 0.00 - 0.20 K/uL    Gran% 75.4 (H) 38.0 - 73.0 %    Lymph% 12.8 (L) 18.0 - 48.0 %    Mono% 12.1 4.0 - 15.0 %    Eosinophil% 0.1 0.0 - 8.0 %    Basophil% 0.2 0.0 - 1.9 %    Platelet Estimate Appears normal     Aniso Slight     Poik Slight     Ovalocytes Occasional     Target Cells Occasional     Spherocytes Occasional     Differential Method Automated    Lactic acid, plasma #1   Result Value Ref Range    Lactate (Lactic Acid) 3.0 (H) 0.5 - 2.2 mmol/L   Urinalysis, Reflex to Urine Culture Urine, Catheterized   Result Value Ref Range    Specimen UA Urine, Catheterized     Color, UA Yellow Yellow, Straw, Scarlet    Appearance, UA Clear Clear    pH, UA 6.0 5.0 - 8.0    Specific Gravity, UA 1.025 1.005 - 1.030    Protein, UA 1+ (A) Negative    Glucose, UA Negative Negative    Ketones, UA Negative Negative    Bilirubin (UA) 1+ (A) Negative    Occult Blood UA 3+ (A) Negative    Nitrite, UA Negative Negative    Urobilinogen, UA Negative <2.0 EU/dL    Leukocytes, UA 1+ (A) Negative   Protime-INR   Result Value Ref Range    Prothrombin Time 12.0 9.0 - 12.5 sec    INR 1.1 0.8 - 1.2   APTT   Result Value Ref Range    aPTT 24.2 21.0 - 32.0 sec   Urinalysis Microscopic   Result Value Ref Range    RBC, UA 5 (H) 0 - 4 /hpf    WBC, UA 13 (H) 0 - 5 /hpf    Bacteria, UA Few (A) None-Occ /hpf    Hyaline Casts, UA 1 0-1/lpf /lpf    Granular Casts, UA 1 (A) None /lpf    Amorphous, UA  Moderate None-Moderate    Microscopic Comment SEE COMMENT    Magnesium   Result Value Ref Range    Magnesium 2.8 (H) 1.6 - 2.6 mg/dL   Troponin I   Result Value Ref Range    Troponin I 0.064 (H) 0.000 - 0.026 ng/mL   Comprehensive metabolic panel   Result Value Ref Range    Sodium 175 (HH) 136 - 145 mmol/L    Potassium 4.5 3.5 - 5.1 mmol/L    Chloride >130 (HH) 95 - 110 mmol/L    CO2 21 (L) 23 - 29 mmol/L    Glucose 129 (H) 70 - 110 mg/dL    BUN, Bld 108 (H) 8 - 23 mg/dL    Creatinine 3.1 (H) 0.5 - 1.4 mg/dL    Calcium 8.8 8.7 - 10.5 mg/dL    Total Protein 7.3 6.0 - 8.4 g/dL    Albumin 3.0 (L) 3.5 - 5.2 g/dL    Total Bilirubin 0.5 0.1 - 1.0 mg/dL    Alkaline Phosphatase 119 55 - 135 U/L    AST 63 (H) 10 - 40 U/L    ALT 63 (H) 10 - 44 U/L    Anion Gap Unable to calculate 8 - 16 mmol/L    eGFR if African American 21 (A) >60 mL/min/1.73 m^2    eGFR if non African American 18 (A) >60 mL/min/1.73 m^2   Procalcitonin   Result Value Ref Range    Procalcitonin 0.20 <0.25 ng/mL   Lactic acid, plasma #2   Result Value Ref Range    Lactate (Lactic Acid) 3.0 (H) 0.5 - 2.2 mmol/L   Basic metabolic panel   Result Value Ref Range    Sodium 172 (HH) 136 - 145 mmol/L    Potassium 3.9 3.5 - 5.1 mmol/L    Chloride >130 (HH) 95 - 110 mmol/L    CO2 16 (L) 23 - 29 mmol/L    Glucose 129 (H) 70 - 110 mg/dL    BUN, Bld 96 (H) 8 - 23 mg/dL    Creatinine 2.8 (H) 0.5 - 1.4 mg/dL    Calcium 8.7 8.7 - 10.5 mg/dL    Anion Gap Unable to calculate 8 - 16 mmol/L    eGFR if African American 23 (A) >60 mL/min/1.73 m^2    eGFR if non African American 20 (A) >60 mL/min/1.73 m^2         Imaging Results:  Imaging Results          CT Head Without Contrast (Final result)  Result time 03/20/19 14:30:08    Final result by REAGAN Gaxiola Sr., MD (03/20/19 14:30:08)                 Impression:      1. There are chronic appearing ischemic changes in the deep white matter of both cerebral hemispheres. There is no evidence of an acute ischemic event.  2. There  is no intracranial hemorrhage.  3. There is mild partial opacification of the posterior aspect of the left ethmoidal sinus.  This is characteristic of sinusitis.  4. There is minimal partial opacification of the mastoid air cells bilaterally.  All CT scans at this facility use dose modulation, iterative reconstruction, and/or weight base dosing when appropriate to reduce radiation dose when appropriate to reduce radiation dose to as low as reasonably achievable.      Electronically signed by: Kunal Gaxiola MD  Date:    03/20/2019  Time:    14:30             Narrative:    EXAMINATION:  CT HEAD WITHOUT CONTRAST    CLINICAL HISTORY:  Confusion/delirium, altered LOC, unexplained;    TECHNIQUE:  Standard brain CT protocol without IV contrast was performed.    COMPARISON:  None    FINDINGS:  There are chronic appearing ischemic changes in the deep white matter of both cerebral hemispheres.  There is no evidence of an acute ischemic event.  There is no intracranial hemorrhage.  There is no calvarial fracture.  There is mild partial opacification of the posterior aspect of the left ethmoidal sinus.  There is minimal partial opacification of the mastoid air cells bilaterally.                               X-Ray Chest AP Portable (Final result)  Result time 03/20/19 12:50:27    Final result by REAGAN Gaxiola Sr., MD (03/20/19 12:50:27)                 Impression:      1. This is a limited examination secondary to the patient being rotated to the left.  2. There is no focal pulmonary infiltrate visualized.  3. Surgical changes  .      Electronically signed by: Kunal Gaxiola MD  Date:    03/20/2019  Time:    12:50             Narrative:    EXAMINATION:  XR CHEST AP PORTABLE    CLINICAL HISTORY:  Sepsis;    COMPARISON:  None    FINDINGS:  This is a limited examination secondary to the patient being rotated to the left.  There are multiple sternotomy wires in place.  There are multiple surgical clips projected over the  mediastinum.  The size of the heart is normal.  There is no focal pulmonary infiltrate visualized.  There is no pneumothorax.  The costophrenic angles are sharp.                               The EKG was ordered, reviewed, and independently interpreted by the ED provider.  Interpretation time: 12:58  Rate: 93 BPM  Rhythm: normal sinus rhythm  Interpretation: Minimal voltage criteria for LVH, may be normal variant. Inferior infarct. Anterior infarct. No STEMI.          The Emergency Provider reviewed the vital signs and test results, which are outlined above.    ED Discussion     3:26 PM: Re-evaluated pt. Pt is resting comfortably.    4:00 PM: Dr. Humera Solis transfers care of pt to Dr. Laurence Solis, pending lab results.    5:15 PM: Discussed case with SEAN Worthy ([facility]). Dr. Sidhu agrees with current care and management of pt and accepts admission.   Admitting Service: Hospital medicine   Admitting Physician: Dr. Sidhu  Admit to: Tele    5:23 PM: Re-evaluated pt. I have discussed test results, shared treatment plan, and the need for admission with patient and family at bedside. Pt and family express understanding at this time and agree with all information. All questions answered. Pt and family have no further questions or concerns at this time. Pt is ready for admit.        ED Medication(s):  Medications   ceFEPIme in dextrose 5% 2 gram/50 mL IVPB 2 g (not administered)   sterile water 1,000 mL with sodium chloride (23.4%) 4 mEq/mL 37.52 mEq infusion ( Intravenous New Bag 3/21/19 0022)   pneumoc 13-konstantin conj-dip cr(PF) (PREVNAR 13 (PF)) 0.5 mL (not administered)   influenza (FLUZONE HIGH-DOSE) vaccine 0.5 mL (not administered)   sodium chloride 0.9% bolus 2,000 mL (0 mLs Intravenous Stopped 3/20/19 1620)   cefepime 2 g in dextrose 5% 50 mL IVPB (ready to mix system) (0 g Intravenous Stopped 3/20/19 1530)             Medical Decision Making    Medical Decision Making:   Clinical  Tests:   Lab Tests: Ordered and Reviewed  Radiological Study: Ordered and Reviewed  Medical Tests: Ordered and Reviewed           Scribe Attestation:   Scribe #1: I performed the above scribed service and the documentation accurately describes the services I performed. I attest to the accuracy of the note.    Attending:   Physician Attestation Statement for Scribe #1: I, Humera Solis MD, personally performed the services described in this documentation, as scribed by Jacqueline Gil/Fannie Gary, in my presence, and it is both accurate and complete.       Scribe Attestation:   Scribe #2: I performed the above scribed service and the documentation accurately describes the services I performed. I attest to the accuracy of the note.    Attending Attestation:           Physician Attestation for Scribe:    Physician Attestation Statement for Scribe #2: I, Laurence Solis MD, reviewed documentation, as scribed by Natalai Deutsch in my presence, and it is both accurate and complete. I also acknowledge and confirm the content of the note done by Scribe #1.          Clinical Impression       ICD-10-CM ICD-9-CM   1. Acute hypernatremia E87.0 276.0   2. Altered mental status R41.82 780.97   3. Acute on chronic renal insufficiency N28.9 593.9    N18.9 585.9   4. Chronic UTI N39.0 599.0       Disposition:   Disposition: Admitted  Condition: Fair         Laurence Solis MD  03/21/19 0108

## 2019-03-20 NOTE — SUBJECTIVE & OBJECTIVE
Past Medical History:   Diagnosis Date    Alzheimer's dementia     CAD (coronary artery disease)     Cancer 2009    bladder    Hyperlipidemia     Hypertension        Past Surgical History:   Procedure Laterality Date    APPENDECTOMY  child    broken leg  1970    leg    CARDIAC CATHETERIZATION      CAROTID ENDARTERECTOMY  2009    cataract surgery  10 yrs ago    CORONARY ARTERY BYPASS GRAFT  2009    CORONARY STENT PLACEMENT  2002    HERNIA REPAIR  age 19    tonsillectomy  age 11    VASCULAR SURGERY  2006       Review of patient's allergies indicates:   Allergen Reactions    Augmentin [amoxicillin-pot clavulanate]      Tolerates cephalosporins       No current facility-administered medications on file prior to encounter.      Current Outpatient Medications on File Prior to Encounter   Medication Sig    acetaminophen (TYLENOL) 325 MG tablet Take 650 mg by mouth every 4 (four) hours as needed for Pain.    cycloSPORINE (RESTASIS) 0.05 % ophthalmic emulsion Place 1 drop into both eyes 2 (two) times daily.    divalproex (DEPAKOTE) 250 MG EC tablet Take 500 mg by mouth nightly.     donepezil (ARICEPT) 5 MG tablet Take 5 mg by mouth every evening.    dutasteride (AVODART) 0.5 mg capsule 1 Capsule(s) Oral PRN Every day.      dutasteride (AVODART) 0.5 mg capsule Take by mouth. 1 Capsule Oral Every day    iron, carbonyl (FEOSOL) 45 mg Tab Take by mouth. 1 Tablet Oral Every day    memantine (NAMENDA) 5 MG Tab Take 5 mg by mouth 2 (two) times daily.    metoprolol succinate (TOPROL-XL) 25 MG 24 hr tablet Take 25 mg by mouth once daily.    MULTIVIT,THER IRON,CA,FA & MIN (MULTIVITAMIN) Tab Take 1 tablet by mouth once daily.    nitroGLYCERIN (NITROSTAT) 0.4 MG SL tablet Place under the tongue. 1 Tablet, Sublingual Sublingual As directed    olanzapine (ZYPREXA) 5 MG tablet Take 5 mg by mouth every evening.    pravastatin (PRAVACHOL) 40 MG tablet Take by mouth. 1 Tablet Oral Every day    simvastatin (ZOCOR)  40 MG tablet Take 40 mg by mouth every evening.    tamsulosin (FLOMAX) 0.4 mg Cp24 Take 0.4 mg by mouth once daily.    venlafaxine (EFFEXOR) 75 MG tablet Take 150 mg by mouth once daily. Takes effexor 75mg with effexor 150 mg to equal 225 mg po q am     Family History     None        Tobacco Use    Smoking status: Current Every Day Smoker     Packs/day: 0.50     Years: 62.00     Pack years: 31.00     Types: Cigarettes   Substance and Sexual Activity    Alcohol use: No     Alcohol/week: 0.0 oz    Drug use: No    Sexual activity: Not on file     Review of Systems   Unable to perform ROS: Acuity of condition     Objective:     Vital Signs (Most Recent):  Temp: 97.7 °F (36.5 °C) (03/20/19 1754)  Pulse: 80 (03/20/19 1801)  Resp: 20 (03/20/19 1801)  BP: (!) 150/74 (03/20/19 1801)  SpO2: 99 % (03/20/19 1801) Vital Signs (24h Range):  Temp:  [97.7 °F (36.5 °C)-98.1 °F (36.7 °C)] 97.7 °F (36.5 °C)  Pulse:  [80-93] 80  Resp:  [18-29] 20  SpO2:  [98 %-100 %] 99 %  BP: (135-157)/(74-86) 150/74     Weight: 68.9 kg (152 lb)  Body mass index is 23.81 kg/m².    Physical Exam   Constitutional:   Cachetic, non verbal   HENT:   Head: Atraumatic.   Right Ear: External ear normal.   Left Ear: External ear normal.   Mouth/Throat: Oropharynx is clear and moist.   Eyes: EOM are normal. Pupils are equal, round, and reactive to light.   Neck: Normal range of motion. Neck supple.   Cardiovascular: Normal rate and regular rhythm.   Pulmonary/Chest: Effort normal and breath sounds normal.   Abdominal: Soft. Bowel sounds are normal.   Musculoskeletal: He exhibits no edema.   Contracted    Neurological:   No verbal    Skin: Skin is warm and dry.   Psychiatric:   Non verbal    Nursing note and vitals reviewed.        CRANIAL NERVES     CN III, IV, VI   Pupils are equal, round, and reactive to light.  Extraocular motions are normal.        Significant Labs:   Blood Culture: No results for input(s): LABBLOO in the last 48 hours.  BMP:   Recent  Labs   Lab 03/20/19  1528   *   *   K 4.5   CL >130*   CO2 21*   *   CREATININE 3.1*   CALCIUM 8.8   MG 2.8*     CBC:   Recent Labs   Lab 03/20/19  1333   WBC 14.25*   HGB 14.7   HCT 50.3        CMP:   Recent Labs   Lab 03/20/19  1528   *   K 4.5   CL >130*   CO2 21*   *   *   CREATININE 3.1*   CALCIUM 8.8   PROT 7.3   ALBUMIN 3.0*   BILITOT 0.5   ALKPHOS 119   AST 63*   ALT 63*   ANIONGAP Unable to calculate   EGFRNONAA 18*     Magnesium:   Recent Labs   Lab 03/20/19  1528   MG 2.8*     Urine Studies:   Recent Labs   Lab 03/20/19  1342   COLORU Yellow   APPEARANCEUA Clear   PHUR 6.0   SPECGRAV 1.025   PROTEINUA 1+*   GLUCUA Negative   KETONESU Negative   BILIRUBINUA 1+*   OCCULTUA 3+*   NITRITE Negative   UROBILINOGEN Negative   LEUKOCYTESUR 1+*   RBCUA 5*   WBCUA 13*   BACTERIA Few*   HYALINECASTS 1     All pertinent labs within the past 24 hours have been reviewed.    Significant Imaging: I have reviewed and interpreted all pertinent imaging results/findings within the past 24 hours.

## 2019-03-20 NOTE — CONSULTS
"[facility] -   Nephrology  Consult Note          Patient Name: David Montero  MRN: [id number]  Admission Date: 3/20/2019  Hospital Length of Stay: 0 days  Attending Provider: Laurence Solis MD   Primary Care Physician: Steve Weeks MD  Principal Problem:<principal problem not specified>    Consults  Subjective:     HPI: 83 year old male with dementia, CAD, h/o bladder cancer, HTN, hyperlipidemia, BPH, femoral artery aneurysm, AAA, GERD, s/p CVA, chronic UTI presents to [facility] with "altered mental status". Patient was transferred from [facility]. Work-up revealed hypernatremia (Na 172) and ARIADNA (creatinine has increased from 1.1 on 3/6/19 to 3.1 on 3/20/19). Also with leucocytosis (WBC 14.2). He received 2 liters of IV fluids in ER.   Nephrology was consulted to help with patient's renal and electrolyte care. Patient was seen and examined in his hospital room. Patient is demented and currently nonverbal. He is not able to provide any additional history.   Chart review revealed that patient is seen at [facility]. Labs from 3/6/19 revealed Na of 140, K-5.3, CO2 of 18, creatinine of 1.1, Calcium of 9.2, WBC of 6.1, Hgb of 14.3, platelets of 262. Urinalysis from 2/26/19 showed 100 protein, > 100 WBC, 5-10 RBC, + LE. ECHO from 3/2/19 revealed EF of 48%.     Past Medical History:   Diagnosis Date    Alzheimer's dementia     CAD (coronary artery disease)     Cancer 2009    bladder    Hyperlipidemia     Hypertension        Past Surgical History:   Procedure Laterality Date    APPENDECTOMY  child    broken leg  1970    leg    CARDIAC CATHETERIZATION      CAROTID ENDARTERECTOMY  2009    cataract surgery  10 yrs ago    CORONARY ARTERY BYPASS GRAFT  2009    CORONARY STENT PLACEMENT  2002    HERNIA REPAIR  age 19    tonsillectomy  age 11    VASCULAR SURGERY  2006       Review of patient's allergies indicates:   Allergen Reactions    Augmentin [amoxicillin-pot clavulanate]      Tolerates " cephalosporins     Current Facility-Administered Medications   Medication Frequency    [START ON 3/21/2019] ceFEPIme in dextrose 5% 2 gram/50 mL IVPB 2 g Q24H     Current Outpatient Medications   Medication    acetaminophen (TYLENOL) 325 MG tablet    cycloSPORINE (RESTASIS) 0.05 % ophthalmic emulsion    divalproex (DEPAKOTE) 250 MG EC tablet    donepezil (ARICEPT) 5 MG tablet    dutasteride (AVODART) 0.5 mg capsule    dutasteride (AVODART) 0.5 mg capsule    iron, carbonyl (FEOSOL) 45 mg Tab    memantine (NAMENDA) 5 MG Tab    metoprolol succinate (TOPROL-XL) 25 MG 24 hr tablet    MULTIVIT,THER IRON,CA,FA & MIN (MULTIVITAMIN) Tab    nitroGLYCERIN (NITROSTAT) 0.4 MG SL tablet    olanzapine (ZYPREXA) 5 MG tablet    pravastatin (PRAVACHOL) 40 MG tablet    simvastatin (ZOCOR) 40 MG tablet    tamsulosin (FLOMAX) 0.4 mg Cp24    venlafaxine (EFFEXOR) 75 MG tablet     Family History     None        Tobacco Use    Smoking status: Current Every Day Smoker     Packs/day: 0.50     Years: 62.00     Pack years: 31.00     Types: Cigarettes   Substance and Sexual Activity    Alcohol use: No     Alcohol/week: 0.0 oz    Drug use: No    Sexual activity: Not on file     Review of Systems   Unable to perform ROS: Dementia     Objective:     Vital Signs (Most Recent):  Temp: 98.1 °F (36.7 °C) (03/20/19 1204)  Pulse: 85 (03/20/19 1529)  Resp: 18 (03/20/19 1529)  BP: (!) 149/75 (03/20/19 1529)  SpO2: 100 % (03/20/19 1529)  O2 Device (Oxygen Therapy): room air (03/20/19 1212) Vital Signs (24h Range):  Temp:  [98.1 °F (36.7 °C)] 98.1 °F (36.7 °C)  Pulse:  [84-93] 85  Resp:  [18-29] 18  SpO2:  [98 %-100 %] 100 %  BP: (135-157)/(75-86) 149/75     Weight: 68.9 kg (152 lb) (03/20/19 1204)  Body mass index is 23.81 kg/m².  Body surface area is 1.8 meters squared.    No intake/output data recorded.    Physical Exam   Constitutional: He appears well-developed.   HENT:   Head: Normocephalic.   Nose: No rhinorrhea.   Mouth/Throat:  Mucous membranes are dry. No oropharyngeal exudate.   Dry lips.    Neck: No thyroid mass present.   Cardiovascular: Normal rate, regular rhythm, S1 normal, S2 normal and intact distal pulses.   Pulmonary/Chest: Effort normal. No respiratory distress. He has no wheezes.   Abdominal: Soft. Bowel sounds are normal. He exhibits no distension. There is no tenderness. No hernia.   Lymphadenopathy:     He has no cervical adenopathy.   Neurological: He is alert.   Skin: Skin is warm and dry.       Significant Labs:  Lab Results   Component Value Date    CREATININE 3.1 (H) 03/20/2019     (H) 03/20/2019     (HH) 03/20/2019    K 4.5 03/20/2019    CL >130 (HH) 03/20/2019    CO2 21 (L) 03/20/2019     Lab Results   Component Value Date    CALCIUM 8.8 03/20/2019     Lab Results   Component Value Date    ALBUMIN 3.0 (L) 03/20/2019     Lab Results   Component Value Date    WBC 14.25 (H) 03/20/2019    HGB 14.7 03/20/2019    HCT 50.3 03/20/2019    MCV 95 03/20/2019     03/20/2019     Recent Labs   Lab 03/20/19  1528   MG 2.8*     Urinalysis  Recent Labs   Lab 03/20/19  1342   COLORU Yellow   SPECGRAV 1.025   PHUR 6.0   PROTEINUA 1+*   BACTERIA Few*   NITRITE Negative   LEUKOCYTESUR 1+*   UROBILINOGEN Negative   HYALINECASTS 1         Significant Imaging:  Imaging Results          CT Head Without Contrast (Final result)  Result time 03/20/19 14:30:08    Final result by REAGAN Gaxiola Sr., MD (03/20/19 14:30:08)                 Impression:      1. There are chronic appearing ischemic changes in the deep white matter of both cerebral hemispheres. There is no evidence of an acute ischemic event.  2. There is no intracranial hemorrhage.  3. There is mild partial opacification of the posterior aspect of the left ethmoidal sinus.  This is characteristic of sinusitis.  4. There is minimal partial opacification of the mastoid air cells bilaterally.  All CT scans at this facility use dose modulation, iterative  reconstruction, and/or weight base dosing when appropriate to reduce radiation dose when appropriate to reduce radiation dose to as low as reasonably achievable.      Electronically signed by: Kunal Gaxiola MD  Date:    03/20/2019  Time:    14:30             Narrative:    EXAMINATION:  CT HEAD WITHOUT CONTRAST    CLINICAL HISTORY:  Confusion/delirium, altered LOC, unexplained;    TECHNIQUE:  Standard brain CT protocol without IV contrast was performed.    COMPARISON:  None    FINDINGS:  There are chronic appearing ischemic changes in the deep white matter of both cerebral hemispheres.  There is no evidence of an acute ischemic event.  There is no intracranial hemorrhage.  There is no calvarial fracture.  There is mild partial opacification of the posterior aspect of the left ethmoidal sinus.  There is minimal partial opacification of the mastoid air cells bilaterally.                               X-Ray Chest AP Portable (Final result)  Result time 03/20/19 12:50:27    Final result by REAGAN Gaxiola Sr., MD (03/20/19 12:50:27)                 Impression:      1. This is a limited examination secondary to the patient being rotated to the left.  2. There is no focal pulmonary infiltrate visualized.  3. Surgical changes  .      Electronically signed by: Kunal Gaxiola MD  Date:    03/20/2019  Time:    12:50             Narrative:    EXAMINATION:  XR CHEST AP PORTABLE    CLINICAL HISTORY:  Sepsis;    COMPARISON:  None    FINDINGS:  This is a limited examination secondary to the patient being rotated to the left.  There are multiple sternotomy wires in place.  There are multiple surgical clips projected over the mediastinum.  The size of the heart is normal.  There is no focal pulmonary infiltrate visualized.  There is no pneumothorax.  The costophrenic angles are sharp.                                  Assessment/Plan:     Leucocytosis    As per hospitalist/ID service.      ARIADNA (acute kidney injury)    Due to  dehydration (see hypernatremia above). Creatinine was 1.1 on 3/6/19. Will hydrate patient with 1/4 NS at 75 cc/hr.      Acute hypernatremia    Patient presents with severe hypernatremia and Na of 175. Na was 140 on 3/6/19. Hypernatremia likely a result of dehydration which is also consistent with physical exam.  Water deficient is about 8 liters. Patient received 2 liter NS bolus in ER (following sepsis protocol). Will start patient on 1/4 NS at 75 cc/hr and recheck Na in am.          Thank you for your consult. I will follow-up with patient. Please contact us if you have any additional questions.    Ta Archibald MD   Nephrology  [facility]

## 2019-03-20 NOTE — ED NOTES
Patients blood work for procalcitonin, troponin, CMP, and magnesium have clotted and hemolyzed twice per lab. Both sets drawn from patients midline. Pj from the lab at bedside to draw green and gold top. Waiting for labs to result.

## 2019-03-21 LAB
ALBUMIN SERPL BCP-MCNC: 2.9 G/DL
ANION GAP SERPL CALC-SCNC: ABNORMAL MMOL/L
BASOPHILS # BLD AUTO: 0.03 K/UL
BASOPHILS NFR BLD: 0.2 %
BUN SERPL-MCNC: 82 MG/DL
BUN SERPL-MCNC: 88 MG/DL
BUN SERPL-MCNC: 93 MG/DL
BUN SERPL-MCNC: 93 MG/DL
BUN SERPL-MCNC: 96 MG/DL
CALCIUM SERPL-MCNC: 8.7 MG/DL
CALCIUM SERPL-MCNC: 8.9 MG/DL
CALCIUM SERPL-MCNC: 9 MG/DL
CHLORIDE SERPL-SCNC: >130 MMOL/L
CO2 SERPL-SCNC: 16 MMOL/L
CO2 SERPL-SCNC: 17 MMOL/L
CREAT SERPL-MCNC: 2.3 MG/DL
CREAT SERPL-MCNC: 2.5 MG/DL
CREAT SERPL-MCNC: 2.8 MG/DL
DIFFERENTIAL METHOD: ABNORMAL
EOSINOPHIL # BLD AUTO: 0 K/UL
EOSINOPHIL NFR BLD: 0.3 %
ERYTHROCYTE [DISTWIDTH] IN BLOOD BY AUTOMATED COUNT: 16 %
EST. GFR  (AFRICAN AMERICAN): 23 ML/MIN/1.73 M^2
EST. GFR  (AFRICAN AMERICAN): 27 ML/MIN/1.73 M^2
EST. GFR  (AFRICAN AMERICAN): 29 ML/MIN/1.73 M^2
EST. GFR  (NON AFRICAN AMERICAN): 20 ML/MIN/1.73 M^2
EST. GFR  (NON AFRICAN AMERICAN): 23 ML/MIN/1.73 M^2
EST. GFR  (NON AFRICAN AMERICAN): 25 ML/MIN/1.73 M^2
GLUCOSE SERPL-MCNC: 109 MG/DL
GLUCOSE SERPL-MCNC: 109 MG/DL
GLUCOSE SERPL-MCNC: 114 MG/DL
GLUCOSE SERPL-MCNC: 121 MG/DL
GLUCOSE SERPL-MCNC: 129 MG/DL
HCT VFR BLD AUTO: 46.2 %
HGB BLD-MCNC: 13.8 G/DL
LACTATE SERPL-SCNC: 1.3 MMOL/L
LYMPHOCYTES # BLD AUTO: 2 K/UL
LYMPHOCYTES NFR BLD: 13.4 %
MAGNESIUM SERPL-MCNC: 2.9 MG/DL
MCH RBC QN AUTO: 28 PG
MCHC RBC AUTO-ENTMCNC: 29.9 G/DL
MCV RBC AUTO: 94 FL
MONOCYTES # BLD AUTO: 1.9 K/UL
MONOCYTES NFR BLD: 12.2 %
NEUTROPHILS # BLD AUTO: 11.2 K/UL
NEUTROPHILS NFR BLD: 73.9 %
PHOSPHATE SERPL-MCNC: 2.9 MG/DL
PLATELET # BLD AUTO: 225 K/UL
PMV BLD AUTO: 12.3 FL
POCT GLUCOSE: 117 MG/DL (ref 70–110)
POTASSIUM SERPL-SCNC: 3.6 MMOL/L
POTASSIUM SERPL-SCNC: 3.8 MMOL/L
POTASSIUM SERPL-SCNC: 3.9 MMOL/L
RBC # BLD AUTO: 4.93 M/UL
SODIUM SERPL-SCNC: 171 MMOL/L
SODIUM SERPL-SCNC: 172 MMOL/L
SODIUM SERPL-SCNC: 173 MMOL/L
SODIUM SERPL-SCNC: 174 MMOL/L
SODIUM SERPL-SCNC: 174 MMOL/L
TROPONIN I SERPL DL<=0.01 NG/ML-MCNC: 0.08 NG/ML
WBC # BLD AUTO: 15.2 K/UL

## 2019-03-21 PROCEDURE — 85025 COMPLETE CBC W/AUTO DIFF WBC: CPT

## 2019-03-21 PROCEDURE — A4217 STERILE WATER/SALINE, 500 ML: HCPCS | Performed by: NURSE PRACTITIONER

## 2019-03-21 PROCEDURE — 84484 ASSAY OF TROPONIN QUANT: CPT

## 2019-03-21 PROCEDURE — 80069 RENAL FUNCTION PANEL: CPT

## 2019-03-21 PROCEDURE — 21400001 HC TELEMETRY ROOM

## 2019-03-21 PROCEDURE — 83605 ASSAY OF LACTIC ACID: CPT

## 2019-03-21 PROCEDURE — 80048 BASIC METABOLIC PNL TOTAL CA: CPT | Mod: 91

## 2019-03-21 PROCEDURE — 83735 ASSAY OF MAGNESIUM: CPT

## 2019-03-21 PROCEDURE — 63600175 PHARM REV CODE 636 W HCPCS: Performed by: NURSE PRACTITIONER

## 2019-03-21 PROCEDURE — 25000003 PHARM REV CODE 250: Performed by: INTERNAL MEDICINE

## 2019-03-21 PROCEDURE — 99233 SBSQ HOSP IP/OBS HIGH 50: CPT | Mod: ,,, | Performed by: INTERNAL MEDICINE

## 2019-03-21 PROCEDURE — 97802 MEDICAL NUTRITION INDIV IN: CPT

## 2019-03-21 PROCEDURE — 80048 BASIC METABOLIC PNL TOTAL CA: CPT

## 2019-03-21 PROCEDURE — 36415 COLL VENOUS BLD VENIPUNCTURE: CPT

## 2019-03-21 PROCEDURE — 63600175 PHARM REV CODE 636 W HCPCS: Performed by: INTERNAL MEDICINE

## 2019-03-21 PROCEDURE — 99233 PR SUBSEQUENT HOSPITAL CARE,LEVL III: ICD-10-PCS | Mod: ,,, | Performed by: INTERNAL MEDICINE

## 2019-03-21 RX ADMIN — SODIUM CHLORIDE: 234 INJECTION INTRAMUSCULAR; INTRAVENOUS; SUBCUTANEOUS at 01:03

## 2019-03-21 RX ADMIN — SODIUM CHLORIDE: 234 INJECTION INTRAMUSCULAR; INTRAVENOUS; SUBCUTANEOUS at 12:03

## 2019-03-21 RX ADMIN — CEFEPIME 2 G: 2 INJECTION, POWDER, FOR SOLUTION INTRAVENOUS at 03:03

## 2019-03-21 NOTE — SUBJECTIVE & OBJECTIVE
Review of Systems   Unable to perform ROS: Acuity of condition     Objective:     Vital Signs (Most Recent):  Temp: 98.7 °F (37.1 °C) (03/21/19 0812)  Pulse: (!) 114 (03/21/19 1115)  Resp: 18 (03/21/19 0812)  BP: 120/73 (03/21/19 0812)  SpO2: (!) 92 % (03/21/19 0812) Vital Signs (24h Range):  Temp:  [97.4 °F (36.3 °C)-98.7 °F (37.1 °C)] 98.7 °F (37.1 °C)  Pulse:  [] 114  Resp:  [18-30] 18  SpO2:  [92 %-100 %] 92 %  BP: (120-180)/() 120/73     Weight: 59.5 kg (131 lb 2.8 oz)  Body mass index is 20.54 kg/m².    Intake/Output Summary (Last 24 hours) at 3/21/2019 1609  Last data filed at 3/21/2019 0600  Gross per 24 hour   Intake 2422.5 ml   Output --   Net 2422.5 ml      Physical Exam   Constitutional:   Cachetic, non verbal   HENT:   Mouth/Throat: Mucous membranes are dry.   Dry, cracked tongue and oral MM   Eyes: EOM are normal. Pupils are unequal.   Left pupil irregular   Neck: Normal range of motion. Neck supple.   Cardiovascular: Normal rate and regular rhythm.   Pulmonary/Chest: Effort normal and breath sounds normal.   Abdominal: Soft. Bowel sounds are normal.   NGT placed   Musculoskeletal: He exhibits no edema.   Contracted    Neurological:   No verbal    Skin: Skin is warm and dry.   Psychiatric:   Non verbal    Nursing note and vitals reviewed.      Significant Labs:   CBC:   Recent Labs   Lab 03/20/19  1333 03/21/19  0525   WBC 14.25* 15.20*   HGB 14.7 13.8*   HCT 50.3 46.2    225     CMP:   Recent Labs   Lab 03/20/19  1528 03/20/19  2359 03/21/19  0525 03/21/19  1351   * 172* 174*  174* 173*   K 4.5 3.9 3.9  3.9 3.8   CL >130* >130* >130*  >130* >130*   CO2 21* 16* 17*  17* 17*   * 129* 109  109 121*   * 96* 93*  93* 88*   CREATININE 3.1* 2.8* 2.5*  2.5* 2.5*   CALCIUM 8.8 8.7 9.0  9.0 9.0   PROT 7.3  --   --   --    ALBUMIN 3.0*  --  2.9*  --    BILITOT 0.5  --   --   --    ALKPHOS 119  --   --   --    AST 63*  --   --   --    ALT 63*  --   --   --     ANIONGAP Unable to calculate Unable to calculate Unable to calculate  Unable to calculate Unable to calculate   EGFRNONAA 18* 20* 23*  23* 23*     All pertinent labs within the past 24 hours have been reviewed.    Significant Imaging: I have reviewed all pertinent imaging results/findings within the past 24 hours.

## 2019-03-21 NOTE — ASSESSMENT & PLAN NOTE
Will use D5 water ,  Nephrology consult .  Will correct water deficit .  Will need close monitoring to over over rapid correction.  Serum sodium was normal on 03.06  3/22 - no improvement - IV fluids changed to sterile water  Also, NGT placed for free water flushes every 6 hours  BMP every 6 hours

## 2019-03-21 NOTE — PLAN OF CARE
Problem: Adult Inpatient Plan of Care  Goal: Plan of Care Review  Outcome: Ongoing (interventions implemented as appropriate)  POC discussed w/patient, patient unable to verbalized understanding.   NSR on monitor. VSS. Alert and awake.   Incontinent X 2.   Midline to Left AC. Clean, dry, and intact.   Sterile water infusing at 75 ml/hr.    Patient turn q2. Patient contracted to lower extremities bilaterally.   Fall precautions in place, bed alarm on, call bell in reach, bed in low and locked position.   Patient remains free from falls/injuries.   Patient remains free from falls/injuries.   Patient denies needs at this time.   Will continue to monitor.

## 2019-03-21 NOTE — ASSESSMENT & PLAN NOTE
Supportive, needs to be DNR, has no family , will plan to do physician directed DNR   3/22 - Physician directed DNR by Marcos Sidhu and Wolfgang

## 2019-03-21 NOTE — ASSESSMENT & PLAN NOTE
Likely pre renal, will continue hydration , needs close monitoring of serum creatine  3/21 - no significant improvement

## 2019-03-21 NOTE — PROGRESS NOTES
[facility] Hospital Medicine  Progress Note    Patient Name: David Montero  MRN: [id number]  Patient Class: IP- Inpatient   Admission Date: 3/20/2019  Length of Stay: 1 days  Attending Physician: David Sidhu MD  Primary Care Provider: Steve Weeks MD        Subjective:     Principal Problem:Acute hypernatremia    HPI:  Patient has altered mental status -history from electronic chart .  a 83 y.o. male patient with history of CAD, AAA, Alzheimer's Dementia, chronic UTI, HTN, presents to the Emergency Department for AMS.  He is a resident of   [facility] [place].     He had recent serum sodium done at [facility]  3/6/2019 3/5/2019 3/4/2019 3/3/2019 3/2/2019 3/1/2019 3/1/2019 2/28/2019 2/28/2019 2/27/2019 2/27/2019 2/27/2019 2/26/2019 12/18/2018 12/17/2018 12/17/2018 12/16/2018 12/16/2018 12/15/2018 12/15/2018 12/14/2018 12/14/2018 12/13/2018 12/13/2018 12/12/2018 11/6/2018 9/3/2018 6/4/2018 3/2/2018 3/1/2018 2/28/2018 2/27/2018 2/26/2018 2/12/2018 12/20/2017 12/15/2017 12/1/2017 9/1/2017 6/1/2017 3/7/2017 5/15/2015 5/14/2015 5/13/2015     140 140 137 140 139     Since admission , serum sodium is 175. He is non verbal   Head CT scan -   There is no evidence of an acute ischemic event.  Previous records from NH- he has no family and is full code.      Hospital Course:  Pt is a nursing home resident with Alzheimer's Dementia, bedbound with contractures. Admitted as his sodium level = 175 and chloride > 130. Pt was nonverbal and according to nursing home needs to be an DNR however, there is no family available. Nephrology assisting with care. IV fluids consisted of sterile water with low sodium chloride content. A NGT was placed and free water flushes were prescribed every 6 hours. BMP ordered every 12 hours. Pt was designated an DNR by Marcos Sidhu and Wolfgang. Empiric cefepime given for leukocytosis with no clear source.         Review of Systems   Unable to perform ROS: Acuity of condition     Objective:     Vital  Signs (Most Recent):  Temp: 98.7 °F (37.1 °C) (03/21/19 0812)  Pulse: (!) 114 (03/21/19 1115)  Resp: 18 (03/21/19 0812)  BP: 120/73 (03/21/19 0812)  SpO2: (!) 92 % (03/21/19 0812) Vital Signs (24h Range):  Temp:  [97.4 °F (36.3 °C)-98.7 °F (37.1 °C)] 98.7 °F (37.1 °C)  Pulse:  [] 114  Resp:  [18-30] 18  SpO2:  [92 %-100 %] 92 %  BP: (120-180)/() 120/73     Weight: 59.5 kg (131 lb 2.8 oz)  Body mass index is 20.54 kg/m².    Intake/Output Summary (Last 24 hours) at 3/21/2019 1609  Last data filed at 3/21/2019 0600  Gross per 24 hour   Intake 2422.5 ml   Output --   Net 2422.5 ml      Physical Exam   Constitutional:   Cachetic, non verbal   HENT:   Mouth/Throat: Mucous membranes are dry.   Dry, cracked tongue and oral MM   Eyes: EOM are normal. Pupils are unequal.   Left pupil irregular   Neck: Normal range of motion. Neck supple.   Cardiovascular: Normal rate and regular rhythm.   Pulmonary/Chest: Effort normal and breath sounds normal.   Abdominal: Soft. Bowel sounds are normal.   NGT placed   Musculoskeletal: He exhibits no edema.   Contracted    Neurological:   No verbal    Skin: Skin is warm and dry.   Psychiatric:   Non verbal    Nursing note and vitals reviewed.      Significant Labs:   CBC:   Recent Labs   Lab 03/20/19  1333 03/21/19  0525   WBC 14.25* 15.20*   HGB 14.7 13.8*   HCT 50.3 46.2    225     CMP:   Recent Labs   Lab 03/20/19  1528 03/20/19  2359 03/21/19  0525 03/21/19  1351   * 172* 174*  174* 173*   K 4.5 3.9 3.9  3.9 3.8   CL >130* >130* >130*  >130* >130*   CO2 21* 16* 17*  17* 17*   * 129* 109  109 121*   * 96* 93*  93* 88*   CREATININE 3.1* 2.8* 2.5*  2.5* 2.5*   CALCIUM 8.8 8.7 9.0  9.0 9.0   PROT 7.3  --   --   --    ALBUMIN 3.0*  --  2.9*  --    BILITOT 0.5  --   --   --    ALKPHOS 119  --   --   --    AST 63*  --   --   --    ALT 63*  --   --   --    ANIONGAP Unable to calculate Unable to calculate Unable to calculate  Unable to calculate  Unable to calculate   EGFRNONAA 18* 20* 23*  23* 23*     All pertinent labs within the past 24 hours have been reviewed.    Significant Imaging: I have reviewed all pertinent imaging results/findings within the past 24 hours.    Assessment/Plan:      Leucocytosis      Will follow cultures to guide therapy , on cefepime  Blood cultures - NGTD  Urine culture results pending  Pt has been afebrile     ARAIDNA (acute kidney injury)      Likely pre renal, will continue hydration , needs close monitoring of serum creatine  3/21 - no significant improvement        Acute hypernatremia      Will use D5 water ,  Nephrology consult .  Will correct water deficit .  Will need close monitoring to over over rapid correction.  Serum sodium was normal on 03.06  3/22 - no improvement - IV fluids changed to sterile water  Also, NGT placed for free water flushes every 6 hours  BMP every 6 hours       Dementia      Supportive, needs to be DNR, has no family , will plan to do physician directed DNR   3/22 - Physician directed DNR by Marcos Sidhu and Wolfgang     Bladder cancer      Continue supportive care ,out patient follow up        VTE Risk Mitigation (From admission, onward)    None              Aurelia Lamas NP  Department of Hospital Medicine   [facility]

## 2019-03-21 NOTE — NURSING
Notified Rosalva, NP patient had 4 beat of V-tach. Patient laying in bed. No distress noted. Morning labs ordered.

## 2019-03-21 NOTE — ASSESSMENT & PLAN NOTE
Due to dehydration (see hypernatremia above). Creatinine was 1.1 on 3/6/19. Will hydrate patient with 1/4 NS at 75 cc/hr and free water. Creatinine has declined to 2.5 today.

## 2019-03-21 NOTE — HOSPITAL COURSE
Pt is a nursing home resident with Alzheimer's Dementia, bedbound with contractures. Admitted as his sodium level = 175 and chloride > 130. Pt was nonverbal and according to nursing home needs to be an DNR however, there is no family available. Nephrology assisting with care. IV fluids consisted of sterile water with low sodium chloride content. A NGT was placed and free water flushes were prescribed every 6 hours. BMP ordered every 12 hours. Pt was designated an DNR by Marcos Sidhu and Wolfgang. Empiric cefepime given for leukocytosis with no clear source.     3/22- remains weak and non verbal, serum sodium is now 162.ABG showed PH -7.49 with Pco2- 23, Co2- 11    3/23- he remains non verbal, NG tube was repositioned , serum sodium is now 159    3/24- frail elderly man, cachectic, non verbal, bed bound, getting IVF- sterile water with NaCl-- Na and Cl slowly improving, no caloric intake, will change IVF to D5W and consider starting TF from tomorrow. Pt appears hospice appropriate.  3/25- pt remains non verbal, eyes open, no other response, getting IVF, NGT to LIS, dark biliary kind of NG aspirate, VSS, Afeb, Chest CTA B, CVS S1S2 RRR, Abd soft, BS present but hypoactive. Bun/Cr improving and Na and Cl also improving. IVF changed to D5W with KCl as k is low- 2.9 as well increased free water via NGT.  We have been trying to contact the family for further instructions about discharge/ Hospice but no answer yet. Apparently the Nurse director at the [facility] is now his sole caretaker. His wife has passed away and there is no other family/NOK known. Pt is altered, nonverbal, unable to express any emotions or respond to touch or pain, unable to ask for water or food or fend for himself. D/w Director of NH, they agree with the assessment and understand his terminal condition and will accept him back at [facility] in the morning.   3/26- pt remains the same, labs better but pt remains  listless, nonverbal and unresponsive. He was seen and examined and deemed appropriate to be discharged to [facility] at [facility].     normal...

## 2019-03-21 NOTE — PROGRESS NOTES
"[facility] -   Nephrology  Progress Note    Patient Name: David Montero  MRN: [id number]  Admission Date: 3/20/2019  Hospital Length of Stay: 1 days  Attending Provider: David Sidhu MD   Primary Care Physician: Steve Weeks MD  Principal Problem:Acute hypernatremia    Subjective:     HPI: 83 year old male with dementia, CAD, h/o bladder cancer, HTN, hyperlipidemia, BPH, femoral artery aneurysm, AAA, GERD, s/p CVA, chronic UTI presents to [facility] with "altered mental status". Patient was transferred from [facility]. Work-up revealed hypernatremia (Na 172) and ARIADNA (creatinine has increased from 1.1 on 3/6/19 to 3.1 on 3/20/19). Also with leucocytosis (WBC 14.2). He received 2 liters of IV fluids in ER.   Nephrology was consulted to help with patient's renal and electrolyte care. Patient was seen and examined in his hospital room. Patient is demented and currently nonverbal. He is not able to provide any additional history.   Chart review revealed that patient is seen at [facility]. Labs from 3/6/19 revealed Na of 140, K-5.3, CO2 of 18, creatinine of 1.1, Calcium of 9.2, WBC of 6.1, Hgb of 14.3, platelets of 262. Urinalysis from 2/26/19 showed 100 protein, > 100 WBC, 5-10 RBC, + LE. ECHO from 3/2/19 revealed EF of 48%.     Interval History:     3/20/19: patient is severely demented. Not responsive.     Review of patient's allergies indicates:   Allergen Reactions    Augmentin [amoxicillin-pot clavulanate]      Tolerates cephalosporins     Current Facility-Administered Medications   Medication Frequency    cefepime 2 g in dextrose 5% 50 mL IVPB (ready to mix system) Q24H    influenza (FLUZONE HIGH-DOSE) vaccine 0.5 mL vaccine x 1 dose    pneumoc 13-konstantin conj-dip cr(PF) (PREVNAR 13 (PF)) 0.5 mL vaccine x 1 dose    sterile water 1,000 mL with sodium chloride (23.4%) 4 mEq/mL 37.52 mEq infusion Continuous       Objective:     Vital Signs (Most Recent):  Temp: 98.7 °F (37.1 °C) (03/21/19 0812)  Pulse: (!) " 114 (03/21/19 1115)  Resp: 18 (03/21/19 0812)  BP: 120/73 (03/21/19 0812)  SpO2: (!) 92 % (03/21/19 0812)  O2 Device (Oxygen Therapy): room air (03/21/19 0812) Vital Signs (24h Range):  Temp:  [97.4 °F (36.3 °C)-98.7 °F (37.1 °C)] 98.7 °F (37.1 °C)  Pulse:  [] 114  Resp:  [18-30] 18  SpO2:  [92 %-100 %] 92 %  BP: (120-180)/() 120/73     Weight: 59.5 kg (131 lb 2.8 oz) (03/21/19 0344)  Body mass index is 20.54 kg/m².  Body surface area is 1.68 meters squared.    I/O last 3 completed shifts:  In: 2472.5 [I.V.:422.5; IV Piggyback:2050]  Out: -     Physical Exam   Constitutional: He appears well-developed.   HENT:   Head: Normocephalic.   Nose: No rhinorrhea.   Mouth/Throat: Mucous membranes are dry. No oropharyngeal exudate.   Dry lips.    Neck: No thyroid mass present.   Cardiovascular: Normal rate, regular rhythm, S1 normal, S2 normal and intact distal pulses.   Pulmonary/Chest: Effort normal. No respiratory distress. He has no wheezes.   Abdominal: Soft. Bowel sounds are normal. He exhibits no distension. There is no tenderness. No hernia.   Lymphadenopathy:     He has no cervical adenopathy.   Neurological: He is alert.   Skin: Skin is warm and dry.       Significant Labs:  Lab Results   Component Value Date    CREATININE 2.5 (H) 03/21/2019    BUN 88 (H) 03/21/2019     (HH) 03/21/2019    K 3.8 03/21/2019    CL >130 (HH) 03/21/2019    CO2 17 (L) 03/21/2019     Lab Results   Component Value Date    CALCIUM 9.0 03/21/2019    PHOS 2.9 03/21/2019     Lab Results   Component Value Date    ALBUMIN 2.9 (L) 03/21/2019     Lab Results   Component Value Date    WBC 15.20 (H) 03/21/2019    HGB 13.8 (L) 03/21/2019    HCT 46.2 03/21/2019    MCV 94 03/21/2019     03/21/2019     Recent Labs   Lab 03/21/19  0525   MG 2.9*         Significant Imaging:  Imaging Results          CT Head Without Contrast (Final result)  Result time 03/20/19 14:30:08    Final result by REAGAN Gaxiola Sr., MD (03/20/19  14:30:08)                 Impression:      1. There are chronic appearing ischemic changes in the deep white matter of both cerebral hemispheres. There is no evidence of an acute ischemic event.  2. There is no intracranial hemorrhage.  3. There is mild partial opacification of the posterior aspect of the left ethmoidal sinus.  This is characteristic of sinusitis.  4. There is minimal partial opacification of the mastoid air cells bilaterally.  All CT scans at this facility use dose modulation, iterative reconstruction, and/or weight base dosing when appropriate to reduce radiation dose when appropriate to reduce radiation dose to as low as reasonably achievable.      Electronically signed by: Kunal Gaxiola MD  Date:    03/20/2019  Time:    14:30             Narrative:    EXAMINATION:  CT HEAD WITHOUT CONTRAST    CLINICAL HISTORY:  Confusion/delirium, altered LOC, unexplained;    TECHNIQUE:  Standard brain CT protocol without IV contrast was performed.    COMPARISON:  None    FINDINGS:  There are chronic appearing ischemic changes in the deep white matter of both cerebral hemispheres.  There is no evidence of an acute ischemic event.  There is no intracranial hemorrhage.  There is no calvarial fracture.  There is mild partial opacification of the posterior aspect of the left ethmoidal sinus.  There is minimal partial opacification of the mastoid air cells bilaterally.                               X-Ray Chest AP Portable (Final result)  Result time 03/20/19 12:50:27    Final result by REAGAN Gaxiola Sr., MD (03/20/19 12:50:27)                 Impression:      1. This is a limited examination secondary to the patient being rotated to the left.  2. There is no focal pulmonary infiltrate visualized.  3. Surgical changes  .      Electronically signed by: Kunal Gaxiola MD  Date:    03/20/2019  Time:    12:50             Narrative:    EXAMINATION:  XR CHEST AP PORTABLE    CLINICAL  HISTORY:  Sepsis;    COMPARISON:  None    FINDINGS:  This is a limited examination secondary to the patient being rotated to the left.  There are multiple sternotomy wires in place.  There are multiple surgical clips projected over the mediastinum.  The size of the heart is normal.  There is no focal pulmonary infiltrate visualized.  There is no pneumothorax.  The costophrenic angles are sharp.                                  Assessment/Plan:     * Acute hypernatremia    Patient presents with severe hypernatremia and Na of 175. Na was 140 on 3/6/19. Hypernatremia likely a result of dehydration which is also consistent with physical exam.  Water deficient is about 8 liters. Patient received 2 liter NS bolus in ER (following sepsis protocol). Will continue 1/4 NS at 75 cc/hr and add free water via NG tube (250 ml q 6 hours). Will monitor Na level closely. Na was 173 on 3/21/19 at 13.51 hours.      ARIADNA (acute kidney injury)    Due to dehydration (see hypernatremia above). Creatinine was 1.1 on 3/6/19. Will hydrate patient with 1/4 NS at 75 cc/hr and free water. Creatinine has declined to 2.5 today.          Thank you for your consult. I will follow-up with patient. Please contact us if you have any additional questions.    Ta Archibald MD  Nephrology  [facility] -

## 2019-03-21 NOTE — SUBJECTIVE & OBJECTIVE
Interval History:     3/20/19: patient is severely demented. Not responsive.     Review of patient's allergies indicates:   Allergen Reactions    Augmentin [amoxicillin-pot clavulanate]      Tolerates cephalosporins     Current Facility-Administered Medications   Medication Frequency    cefepime 2 g in dextrose 5% 50 mL IVPB (ready to mix system) Q24H    influenza (FLUZONE HIGH-DOSE) vaccine 0.5 mL vaccine x 1 dose    pneumoc 13-konstantin conj-dip cr(PF) (PREVNAR 13 (PF)) 0.5 mL vaccine x 1 dose    sterile water 1,000 mL with sodium chloride (23.4%) 4 mEq/mL 37.52 mEq infusion Continuous       Objective:     Vital Signs (Most Recent):  Temp: 98.7 °F (37.1 °C) (03/21/19 0812)  Pulse: (!) 114 (03/21/19 1115)  Resp: 18 (03/21/19 0812)  BP: 120/73 (03/21/19 0812)  SpO2: (!) 92 % (03/21/19 0812)  O2 Device (Oxygen Therapy): room air (03/21/19 0812) Vital Signs (24h Range):  Temp:  [97.4 °F (36.3 °C)-98.7 °F (37.1 °C)] 98.7 °F (37.1 °C)  Pulse:  [] 114  Resp:  [18-30] 18  SpO2:  [92 %-100 %] 92 %  BP: (120-180)/() 120/73     Weight: 59.5 kg (131 lb 2.8 oz) (03/21/19 0344)  Body mass index is 20.54 kg/m².  Body surface area is 1.68 meters squared.    I/O last 3 completed shifts:  In: 2472.5 [I.V.:422.5; IV Piggyback:2050]  Out: -     Physical Exam   Constitutional: He appears well-developed.   HENT:   Head: Normocephalic.   Nose: No rhinorrhea.   Mouth/Throat: Mucous membranes are dry. No oropharyngeal exudate.   Dry lips.    Neck: No thyroid mass present.   Cardiovascular: Normal rate, regular rhythm, S1 normal, S2 normal and intact distal pulses.   Pulmonary/Chest: Effort normal. No respiratory distress. He has no wheezes.   Abdominal: Soft. Bowel sounds are normal. He exhibits no distension. There is no tenderness. No hernia.   Lymphadenopathy:     He has no cervical adenopathy.   Neurological: He is alert.   Skin: Skin is warm and dry.       Significant Labs:  Lab Results   Component Value Date    CREATININE  2.5 (H) 03/21/2019    BUN 88 (H) 03/21/2019     (HH) 03/21/2019    K 3.8 03/21/2019    CL >130 (HH) 03/21/2019    CO2 17 (L) 03/21/2019     Lab Results   Component Value Date    CALCIUM 9.0 03/21/2019    PHOS 2.9 03/21/2019     Lab Results   Component Value Date    ALBUMIN 2.9 (L) 03/21/2019     Lab Results   Component Value Date    WBC 15.20 (H) 03/21/2019    HGB 13.8 (L) 03/21/2019    HCT 46.2 03/21/2019    MCV 94 03/21/2019     03/21/2019     Recent Labs   Lab 03/21/19  0525   MG 2.9*         Significant Imaging:  Imaging Results          CT Head Without Contrast (Final result)  Result time 03/20/19 14:30:08    Final result by REAGAN Gaxiola Sr., MD (03/20/19 14:30:08)                 Impression:      1. There are chronic appearing ischemic changes in the deep white matter of both cerebral hemispheres. There is no evidence of an acute ischemic event.  2. There is no intracranial hemorrhage.  3. There is mild partial opacification of the posterior aspect of the left ethmoidal sinus.  This is characteristic of sinusitis.  4. There is minimal partial opacification of the mastoid air cells bilaterally.  All CT scans at this facility use dose modulation, iterative reconstruction, and/or weight base dosing when appropriate to reduce radiation dose when appropriate to reduce radiation dose to as low as reasonably achievable.      Electronically signed by: Kunal Gaxiola MD  Date:    03/20/2019  Time:    14:30             Narrative:    EXAMINATION:  CT HEAD WITHOUT CONTRAST    CLINICAL HISTORY:  Confusion/delirium, altered LOC, unexplained;    TECHNIQUE:  Standard brain CT protocol without IV contrast was performed.    COMPARISON:  None    FINDINGS:  There are chronic appearing ischemic changes in the deep white matter of both cerebral hemispheres.  There is no evidence of an acute ischemic event.  There is no intracranial hemorrhage.  There is no calvarial fracture.  There is mild partial opacification  of the posterior aspect of the left ethmoidal sinus.  There is minimal partial opacification of the mastoid air cells bilaterally.                               X-Ray Chest AP Portable (Final result)  Result time 03/20/19 12:50:27    Final result by REAGAN Gaxiola Sr., MD (03/20/19 12:50:27)                 Impression:      1. This is a limited examination secondary to the patient being rotated to the left.  2. There is no focal pulmonary infiltrate visualized.  3. Surgical changes  .      Electronically signed by: Kunal Gaxiola MD  Date:    03/20/2019  Time:    12:50             Narrative:    EXAMINATION:  XR CHEST AP PORTABLE    CLINICAL HISTORY:  Sepsis;    COMPARISON:  None    FINDINGS:  This is a limited examination secondary to the patient being rotated to the left.  There are multiple sternotomy wires in place.  There are multiple surgical clips projected over the mediastinum.  The size of the heart is normal.  There is no focal pulmonary infiltrate visualized.  There is no pneumothorax.  The costophrenic angles are sharp.

## 2019-03-21 NOTE — NURSING
3 failed attempts to insert NGT by Inocencia Epperson RN. NGT kept coiling. Md Sidhu came to bedside and attempted insertion. NGT was coiled in pt throat per xray. Another insertion attempt made by Nahum. Cxray showed tube needed to by advanced. Advanced by Inocencia ROSADO. Pending new xray.

## 2019-03-21 NOTE — PROGRESS NOTES
Patient Deterioration Alert    Deterioration alert received.  Patient seen and examined, appears comfortable in NAD.  VS and labs have been reviewed.  No acute change in status.  Sterile water IVFs infusing.  Will check repeat Na level.  Continue current treatment plan.            Rosalva Crawford, NP

## 2019-03-21 NOTE — PLAN OF CARE
Veronica completed medical record review. Pt unable to answer questions. Veronica contacted [facility] and pt is their resident. Veronica spoke with Kayla and she reports pt does not have any family. Kayla reports pt needs hospice but their is no one to sign consents. Pt will return to [facility].        03/21/19 1548   Discharge Assessment   Assessment Type Discharge Planning Assessment   Confirmed/corrected address and phone number on facesheet? Yes   Assessment information obtained from? Medical Record   Communicated expected length of stay with patient/caregiver yes   Do you have any problems affording any of your prescribed medications? TBD   Discharge Plan A Return to nursing home   Patient/Family in Agreement with Plan unable to assess

## 2019-03-21 NOTE — ED NOTES
NGT placement attempted 4x by 2 nurses. Unable to achieve placement. Hosp medicine secure chatted and notified and CN Aisha notified. Pt is trying to pull at tube while being placed.

## 2019-03-21 NOTE — PLAN OF CARE
Problem: Adult Inpatient Plan of Care  Goal: Plan of Care Review  Outcome: Ongoing (interventions implemented as appropriate)    Recommendations     Recommendation: 1. ST eval. 2. When medically able, ADAT to Cardiac with consistency per ST. 3. If unable to adv diet and TF warranted rec Novasource @ 35 ml/hr to provide 1680 kcal, 76 g protein and 602 ml free water. 4. Will continue to monitor.   Intervention: coordination of care   Goals: Meet > 85 % EEN/EPN while admitted  Nutrition Goal Status: new  Communication of RD Recs: (POc, sticky note, reviewed with RN)

## 2019-03-21 NOTE — ASSESSMENT & PLAN NOTE
Patient presents with severe hypernatremia and Na of 175. Na was 140 on 3/6/19. Hypernatremia likely a result of dehydration which is also consistent with physical exam.  Water deficient is about 8 liters. Patient received 2 liter NS bolus in ER (following sepsis protocol). Will continue 1/4 NS at 75 cc/hr and add free water via NG tube (250 ml q 6 hours). Will monitor Na level closely. Na was 173 on 3/21/19 at 13.51 hours.

## 2019-03-21 NOTE — ASSESSMENT & PLAN NOTE
Will follow cultures to guide therapy , on cefepime  Blood cultures - NGTD  Urine culture results pending  Pt has been afebrile

## 2019-03-21 NOTE — CONSULTS
"  [facility] -   Adult Nutrition  Consult Note    SUMMARY     Recommendations    Recommendation: 1. ST eval. 2. When medically able, ADAT to Cardiac with consistency per ST. 3. If unable to adv diet and TF warranted rec Novasource @ 35 ml/hr to provide 1680 kcal, 76 g protein and 602 ml free water. 4. Will continue to monitor.   Intervention: coordination of care   Goals: Meet > 85 % EEN/EPN while admitted  Nutrition Goal Status: new  Communication of RD Recs: (POc, sticky note, reviewed with RN)    Reason for Assessment    Reason For Assessment: consult   Dx:  1. Acute hypernatremia    2. Altered mental status    3. Acute on chronic renal insufficiency    4. Chronic UTI      Past Medical History:   Diagnosis Date    Alzheimer's dementia     CAD (coronary artery disease)     Cancer 2009    bladder    Hyperlipidemia     Hypertension      General info comments: Pt w/ dementia, non-verbal. Currently NPO. Failed nsg dysphagia screen. No family members at bedside at time of visit. Per NFPE 3.21.19: moderate subcutaneous fat and muscle loss. Per epic records: no recent wt hx, last wt recorded in 2015.   Discharge plan: pending medical course     Nutrition Risk Screen    Nutrition Risk Screen: other (see comments)(MARIANNE -nonverbal)    Nutrition/Diet History    Spiritual, Cultural Beliefs, Sikh Practices, Values that Affect Care: other (see comments)(MARIANNE-nonverbal)    Anthropometrics    Temp: 98.7 °F (37.1 °C)  Height Method: Estimated  Height: 5' 7" (170.2 cm)  Height (inches): 67 in  Weight Method: Bed Scale  Weight: 59.5 kg (131 lb 2.8 oz)  Weight (lb): 131.18 lb  Ideal Body Weight (IBW), Male: 148 lb  % Ideal Body Weight, Male (lb): 88.64 lb  BMI (Calculated): 20.6  BMI Grade: 18.5-24.9 - normal       Lab/Procedures/Meds    Pertinent Labs Reviewed: reviewed  BMP  Lab Results   Component Value Date     () 03/21/2019     () 03/21/2019    K 3.9 03/21/2019    K 3.9 03/21/2019    CL >130 " (HH) 03/21/2019    CL >130 (HH) 03/21/2019    CO2 17 (L) 03/21/2019    CO2 17 (L) 03/21/2019    BUN 93 (H) 03/21/2019    BUN 93 (H) 03/21/2019    CREATININE 2.5 (H) 03/21/2019    CREATININE 2.5 (H) 03/21/2019    CALCIUM 9.0 03/21/2019    CALCIUM 9.0 03/21/2019    ANIONGAP Unable to calculate 03/21/2019    ANIONGAP Unable to calculate 03/21/2019    ESTGFRAFRICA 27 (A) 03/21/2019    ESTGFRAFRICA 27 (A) 03/21/2019    EGFRNONAA 23 (A) 03/21/2019    EGFRNONAA 23 (A) 03/21/2019     Lab Results   Component Value Date    CALCIUM 9.0 03/21/2019    CALCIUM 9.0 03/21/2019    PHOS 2.9 03/21/2019     Lab Results   Component Value Date    ALBUMIN 2.9 (L) 03/21/2019     No results for input(s): POCTGLUCOSE in the last 24 hours.      Pertinent Medications Reviewed: reviewed    Physical Findings/Assessment     skin: Jamal score 11     Estimated/Assessed Needs    Weight Used For Calorie Calculations: 59.5 kg (131 lb 2.8 oz)  Energy Calorie Requirements (kcal): 1547- 1785   Energy Need Method: Kcal/kg  Protein Requirements: 59 - 71 g  Weight Used For Protein Calculations: 59.5 kg (131 lb 2.8 oz)     Estimated Fluid Requirement Method: RDA Method(or per MD)  RDA Method (mL): 1785         Nutrition Prescription Ordered    Nutrition Order Comments: NPO    Evaluation of Received Nutrient/Fluid Intake          Intake/Output Summary (Last 24 hours) at 3/21/2019 1410  Last data filed at 3/21/2019 0600  Gross per 24 hour   Intake 2472.5 ml   Output --   Net 2472.5 ml       % Intake of Estimated Energy Needs: 0 - 25 %  % Meal Intake: NPO    Nutrition Risk      2xweekly    Assessment and Plan    Nutrition Problem  inadequate energy intake   Related to (etiology):   NPO status, no alternative means of nutrition    Signs and Symptoms (as evidenced by):   Meeting < 85 % EEN/EPN    Interventions/Recommendations (treatment strategy):  See above     Nutrition Diagnosis Status:   New      Monitor and Evaluation    Food and Nutrient Intake: energy  intake, food and beverage intake  Food and Nutrient Adminstration: diet order  Anthropometric Measurements: weight  Biochemical Data, Medical Tests and Procedures: electrolyte and renal panel, glucose/endocrine profile  Nutrition-Focused Physical Findings: overall appearance     Malnutrition Assessment                 Orbital Region (Subcutaneous Fat Loss): moderate depletion  Thoracic and Lumbar Region: moderate depletion   Anabaptism Region (Muscle Loss): moderate depletion  Clavicle Bone Region (Muscle Loss): moderate depletion  Clavicle and Acromion Bone Region (Muscle Loss): moderate depletion  Scapular Bone Region (Muscle Loss): moderate depletion                 Nutrition Follow-Up    RD Follow-up?: Yes

## 2019-03-21 NOTE — NURSING
Notified Rosalva, NP patient is breathing 30 times per minute. Patient is laying in bed sleeping in no distress at this time. No new orders at this time. Will monitor AM labs and continue to monitor patient.

## 2019-03-22 LAB
ALBUMIN SERPL BCP-MCNC: 2.7 G/DL
ALLENS TEST: ABNORMAL
ANION GAP SERPL CALC-SCNC: ABNORMAL MMOL/L
BACTERIA UR CULT: NO GROWTH
BASOPHILS # BLD AUTO: 0.03 K/UL
BASOPHILS NFR BLD: 0.2 %
BUN SERPL-MCNC: 73 MG/DL
BUN SERPL-MCNC: 80 MG/DL
BUN SERPL-MCNC: 80 MG/DL
CALCIUM SERPL-MCNC: 8.7 MG/DL
CALCIUM SERPL-MCNC: 9 MG/DL
CALCIUM SERPL-MCNC: 9.2 MG/DL
CHLORIDE SERPL-SCNC: >130 MMOL/L
CO2 SERPL-SCNC: 11 MMOL/L
CO2 SERPL-SCNC: 15 MMOL/L
CO2 SERPL-SCNC: 15 MMOL/L
CREAT SERPL-MCNC: 2.3 MG/DL
CREAT SERPL-MCNC: 2.4 MG/DL
CREAT SERPL-MCNC: 2.5 MG/DL
DACRYOCYTES BLD QL SMEAR: ABNORMAL
DELSYS: ABNORMAL
DIFFERENTIAL METHOD: ABNORMAL
EOSINOPHIL # BLD AUTO: 0 K/UL
EOSINOPHIL NFR BLD: 0.2 %
ERYTHROCYTE [DISTWIDTH] IN BLOOD BY AUTOMATED COUNT: 16.2 %
EST. GFR  (AFRICAN AMERICAN): 27 ML/MIN/1.73 M^2
EST. GFR  (AFRICAN AMERICAN): 28 ML/MIN/1.73 M^2
EST. GFR  (AFRICAN AMERICAN): 29 ML/MIN/1.73 M^2
EST. GFR  (NON AFRICAN AMERICAN): 23 ML/MIN/1.73 M^2
EST. GFR  (NON AFRICAN AMERICAN): 24 ML/MIN/1.73 M^2
EST. GFR  (NON AFRICAN AMERICAN): 25 ML/MIN/1.73 M^2
FIO2: 21
GLUCOSE SERPL-MCNC: 108 MG/DL
GLUCOSE SERPL-MCNC: 74 MG/DL
GLUCOSE SERPL-MCNC: 99 MG/DL
HCO3 UR-SCNC: 16 MMOL/L (ref 24–28)
HCT VFR BLD AUTO: 43 %
HGB BLD-MCNC: 12.9 G/DL
LYMPHOCYTES # BLD AUTO: 1.7 K/UL
LYMPHOCYTES NFR BLD: 10.6 %
MCH RBC QN AUTO: 27.8 PG
MCHC RBC AUTO-ENTMCNC: 30 G/DL
MCV RBC AUTO: 93 FL
MODE: ABNORMAL
MONOCYTES # BLD AUTO: 1.8 K/UL
MONOCYTES NFR BLD: 11.4 %
NEUTROPHILS # BLD AUTO: 12.5 K/UL
NEUTROPHILS NFR BLD: 78 %
OVALOCYTES BLD QL SMEAR: ABNORMAL
PCO2 BLDA: 23.1 MMHG (ref 35–45)
PH SMN: 7.45 [PH] (ref 7.35–7.45)
PHOSPHATE SERPL-MCNC: 3.4 MG/DL
PLATELET # BLD AUTO: 207 K/UL
PMV BLD AUTO: 13.2 FL
PO2 BLDA: 66 MMHG (ref 80–100)
POC BE: -8 MMOL/L
POC SATURATED O2: 94 % (ref 95–100)
POIKILOCYTOSIS BLD QL SMEAR: SLIGHT
POTASSIUM SERPL-SCNC: 3.7 MMOL/L
POTASSIUM SERPL-SCNC: 3.8 MMOL/L
POTASSIUM SERPL-SCNC: 5.2 MMOL/L
RBC # BLD AUTO: 4.64 M/UL
SAMPLE: ABNORMAL
SITE: ABNORMAL
SODIUM SERPL-SCNC: 162 MMOL/L
SODIUM SERPL-SCNC: 168 MMOL/L
SODIUM SERPL-SCNC: 168 MMOL/L
WBC # BLD AUTO: 16.09 K/UL

## 2019-03-22 PROCEDURE — 63600175 PHARM REV CODE 636 W HCPCS: Performed by: INTERNAL MEDICINE

## 2019-03-22 PROCEDURE — 99900035 HC TECH TIME PER 15 MIN (STAT)

## 2019-03-22 PROCEDURE — 85025 COMPLETE CBC W/AUTO DIFF WBC: CPT

## 2019-03-22 PROCEDURE — 25000003 PHARM REV CODE 250: Performed by: INTERNAL MEDICINE

## 2019-03-22 PROCEDURE — 80069 RENAL FUNCTION PANEL: CPT

## 2019-03-22 PROCEDURE — 99233 PR SUBSEQUENT HOSPITAL CARE,LEVL III: ICD-10-PCS | Mod: ,,, | Performed by: INTERNAL MEDICINE

## 2019-03-22 PROCEDURE — A4217 STERILE WATER/SALINE, 500 ML: HCPCS | Performed by: NURSE PRACTITIONER

## 2019-03-22 PROCEDURE — 63600175 PHARM REV CODE 636 W HCPCS: Performed by: NURSE PRACTITIONER

## 2019-03-22 PROCEDURE — 82803 BLOOD GASES ANY COMBINATION: CPT

## 2019-03-22 PROCEDURE — 80048 BASIC METABOLIC PNL TOTAL CA: CPT | Mod: 91

## 2019-03-22 PROCEDURE — 99233 SBSQ HOSP IP/OBS HIGH 50: CPT | Mod: ,,, | Performed by: INTERNAL MEDICINE

## 2019-03-22 PROCEDURE — 36415 COLL VENOUS BLD VENIPUNCTURE: CPT

## 2019-03-22 PROCEDURE — 21400001 HC TELEMETRY ROOM

## 2019-03-22 PROCEDURE — 36600 WITHDRAWAL OF ARTERIAL BLOOD: CPT

## 2019-03-22 RX ORDER — ENOXAPARIN SODIUM 100 MG/ML
30 INJECTION SUBCUTANEOUS EVERY 24 HOURS
Status: DISCONTINUED | OUTPATIENT
Start: 2019-03-22 | End: 2019-03-26 | Stop reason: HOSPADM

## 2019-03-22 RX ORDER — TAMSULOSIN HYDROCHLORIDE 0.4 MG/1
0.4 CAPSULE ORAL DAILY
Status: DISCONTINUED | OUTPATIENT
Start: 2019-03-22 | End: 2019-03-26 | Stop reason: HOSPADM

## 2019-03-22 RX ORDER — MEMANTINE HYDROCHLORIDE 5 MG/1
5 TABLET ORAL 2 TIMES DAILY
Status: DISCONTINUED | OUTPATIENT
Start: 2019-03-22 | End: 2019-03-26 | Stop reason: HOSPADM

## 2019-03-22 RX ORDER — OLANZAPINE 5 MG/1
5 TABLET ORAL NIGHTLY
Status: DISCONTINUED | OUTPATIENT
Start: 2019-03-22 | End: 2019-03-26 | Stop reason: HOSPADM

## 2019-03-22 RX ORDER — VENLAFAXINE 75 MG/1
150 TABLET ORAL DAILY
Status: DISCONTINUED | OUTPATIENT
Start: 2019-03-22 | End: 2019-03-26 | Stop reason: HOSPADM

## 2019-03-22 RX ORDER — ACETAMINOPHEN 325 MG/1
650 TABLET ORAL EVERY 4 HOURS PRN
Status: DISCONTINUED | OUTPATIENT
Start: 2019-03-22 | End: 2019-03-26 | Stop reason: HOSPADM

## 2019-03-22 RX ORDER — PRAVASTATIN SODIUM 20 MG/1
20 TABLET ORAL DAILY
Status: DISCONTINUED | OUTPATIENT
Start: 2019-03-22 | End: 2019-03-25

## 2019-03-22 RX ADMIN — ENOXAPARIN SODIUM 30 MG: 100 INJECTION SUBCUTANEOUS at 11:03

## 2019-03-22 RX ADMIN — SODIUM CHLORIDE: 234 INJECTION INTRAMUSCULAR; INTRAVENOUS; SUBCUTANEOUS at 06:03

## 2019-03-22 RX ADMIN — PRAVASTATIN SODIUM 20 MG: 20 TABLET ORAL at 11:03

## 2019-03-22 RX ADMIN — TAMSULOSIN HYDROCHLORIDE 0.4 MG: 0.4 CAPSULE ORAL at 10:03

## 2019-03-22 RX ADMIN — MEMANTINE HYDROCHLORIDE 5 MG: 5 TABLET ORAL at 11:03

## 2019-03-22 RX ADMIN — VENLAFAXINE 150 MG: 75 TABLET ORAL at 11:03

## 2019-03-22 RX ADMIN — CEFEPIME 2 G: 2 INJECTION, POWDER, FOR SOLUTION INTRAVENOUS at 02:03

## 2019-03-22 RX ADMIN — MEMANTINE HYDROCHLORIDE 5 MG: 5 TABLET ORAL at 10:03

## 2019-03-22 RX ADMIN — SODIUM CHLORIDE: 234 INJECTION INTRAMUSCULAR; INTRAVENOUS; SUBCUTANEOUS at 03:03

## 2019-03-22 RX ADMIN — OLANZAPINE 5 MG: 5 TABLET, FILM COATED ORAL at 10:03

## 2019-03-22 NOTE — ASSESSMENT & PLAN NOTE
ARIADNA. Due to dehydration.  Baseline s Cr not known.  May have CKD. Repeat labs will clarify  s C r has improved with IVF's. Stable, not worse.

## 2019-03-22 NOTE — ASSESSMENT & PLAN NOTE
Severe hypernatremia due to dehydration  Has improved somewhat with hypotonic IVF's  Mgmt reviewed  s Na still high  Will increase rate of IVF's to 100 ml/hr  Will add water by NG tube at 300 ml q 6 hours.

## 2019-03-22 NOTE — PROGRESS NOTES
"[facility] -   Nephrology  Progress Note    Patient Name: David Montero  MRN: [id number]  Admission Date: 3/20/2019  Hospital Length of Stay: 2 days  Attending Provider: aDvid Sidhu MD   Primary Care Physician: Steve Weeks MD  Principal Problem:Acute hypernatremia    Subjective:     HPI: 83 year old male with dementia, CAD, h/o bladder cancer, HTN, hyperlipidemia, BPH, femoral artery aneurysm, AAA, GERD, s/p CVA, chronic UTI presents to [facility] with "altered mental status". Patient was transferred from [facility] [place] by [facility]. Work-up revealed hypernatremia (Na 172) and ARIADNA (creatinine has increased from 1.1 on 3/6/19 to 3.1 on 3/20/19). Also with leucocytosis (WBC 14.2). He received 2 liters of IV fluids in ER.   Nephrology was consulted to help with patient's renal and electrolyte care. Patient was seen and examined in his hospital room. Patient is demented and currently nonverbal. He is not able to provide any additional history.   Chart review revealed that patient is seen at [facility]. Labs from 3/6/19 revealed Na of 140, K-5.3, CO2 of 18, creatinine of 1.1, Calcium of 9.2, WBC of 6.1, Hgb of 14.3, platelets of 262. Urinalysis from 2/26/19 showed 100 protein, > 100 WBC, 5-10 RBC, + LE. ECHO from 3/2/19 revealed EF of 48%.     Interval History: Pt was seen and examined. H/o and chart reviewed. Pt is a 82 y/o male with hypernatremia and ARIADNA. Baseline s Cr not known. Has a h/o of HTN, bladder cancer, dementia,and CVA. Pt was admitted for change in MS. Pt is non verbal and can not provided a h/o.     Review of patient's allergies indicates:   Allergen Reactions    Augmentin [amoxicillin-pot clavulanate]      Tolerates cephalosporins     Current Facility-Administered Medications   Medication Frequency    acetaminophen tablet 650 mg Q4H PRN    cefepime 2 g in dextrose 5% 50 mL IVPB (ready to mix system) Q24H    enoxaparin injection 30 mg Daily    influenza (FLUZONE HIGH-DOSE) vaccine 0.5 mL vaccine x 1 dose    " memantine tablet 5 mg BID    OLANZapine tablet 5 mg QHS    pneumoc 13-konstantin conj-dip cr(PF) (PREVNAR 13 (PF)) 0.5 mL vaccine x 1 dose    pravastatin tablet 20 mg Daily    sterile water 1,000 mL with sodium chloride (23.4%) 4 mEq/mL 37.52 mEq infusion Continuous    tamsulosin 24 hr capsule 0.4 mg Daily    venlafaxine tablet 150 mg Daily       Objective:     Vital Signs (Most Recent):  Temp: 98.5 °F (36.9 °C) (03/22/19 1552)  Pulse: 92 (03/22/19 1715)  Resp: 18 (03/22/19 1552)  BP: 111/62 (03/22/19 1552)  SpO2: (!) 93 % (03/22/19 1552)  O2 Device (Oxygen Therapy): room air (03/22/19 0737) Vital Signs (24h Range):  Temp:  [98.2 °F (36.8 °C)-99.9 °F (37.7 °C)] 98.5 °F (36.9 °C)  Pulse:  [] 92  Resp:  [18-44] 18  SpO2:  [88 %-94 %] 93 %  BP: (111-163)/(62-83) 111/62     Weight: 60 kg (132 lb 4.4 oz) (03/22/19 0356)  Body mass index is 20.72 kg/m².  Body surface area is 1.68 meters squared.    I/O last 3 completed shifts:  In: 3097.5 [I.V.:2297.5; NG/GT:750; IV Piggyback:50]  Out: -     Physical Exam   Constitutional: He is oriented to person, place, and time. He appears well-developed.   Looks thin and malnourished   HENT:   Head: Normocephalic and atraumatic.   Neck: No JVD present.   Cardiovascular: Normal rate, regular rhythm and normal heart sounds. Exam reveals no friction rub.   Pulmonary/Chest: Effort normal and breath sounds normal. No respiratory distress. He has no rales.   Abdominal: Soft. Bowel sounds are normal. He exhibits no distension. There is no guarding.   Musculoskeletal: He exhibits no edema.   Neurological: He is alert and oriented to person, place, and time.   Skin: Skin is warm and dry.   Psychiatric: He has a normal mood and affect. His behavior is normal.   Nursing note and vitals reviewed.      Significant Labs: reviewed  BMP  Lab Results   Component Value Date     () 03/22/2019    K 3.8 03/22/2019    CL >130 (HH) 03/22/2019    CO2 15 (L) 03/22/2019    BUN 80 (H) 03/22/2019     CREATININE 2.5 (H) 03/22/2019    CALCIUM 9.0 03/22/2019    ANIONGAP Unable to calculate 03/22/2019    ESTGFRAFRICA 27 (A) 03/22/2019    EGFRNONAA 23 (A) 03/22/2019         Significant Imaging: reviewed    Assessment/Plan:   84 y/o male with change in MS and hypernatremia:    * Acute hypernatremia    Severe hypernatremia due to dehydration  Has improved somewhat with hypotonic IVF's  Mgmt reviewed  s Na still high  Will increase rate of IVF's to 100 ml/hr  Will add water by NG tube at 300 ml q 6 hours.       ARIADNA (acute kidney injury)    ARIADNA. Due to dehydration.  Baseline s Cr not known.  May have CKD. Repeat labs will clarify  s C r has improved with IVF's. Stable, not worse.     Dementia    Baseline dementia  Baseline MS not known  Presented with change in mental status, likely due to severe hypernatremia       Plans and recommendations:   As detailed above.    Rommel Tellez MD  Nephrology  [facility]

## 2019-03-22 NOTE — ASSESSMENT & PLAN NOTE
Baseline dementia  Baseline MS not known  Presented with change in mental status, likely due to severe hypernatremia

## 2019-03-22 NOTE — PLAN OF CARE
Problem: Adult Inpatient Plan of Care  Goal: Plan of Care Review  Outcome: Outcome(s) achieved Date Met: 03/21/19  POC discussed w/ patient, no evidence of learning.   NSR on monitor. VSS. Patient non-verbal, arouses to voice.   DNR obtained by MD Sidhu and second signature of MD Goss  Assistance X2 . Turned Q2, heels floated, pressure points protected.  IV intact. Medications administered as prescribed.   1/4NS infusing at 75ml/hr.    Weight shift assistance provided.  NG tube placed, after several attempts. Oral care provided. 250ml free water Q6H  Fall precautions in place, call bell in reach, bed in low and locked position.   Patient remains free from falls/injuries.   Patient denies needs at this time.    Will continue to monitor.

## 2019-03-22 NOTE — PLAN OF CARE
Problem: Adult Inpatient Plan of Care  Goal: Plan of Care Review  Outcome: Ongoing (interventions implemented as appropriate)  POC discussed w/ patient, no evidence of learning.   NSR on monitor. VSS. Patient non-verbal, arouses to voice.   Assistance X2 . Turned Q2, heels floated, pressure points protected. Weight shift assistance provided.  IV intact. Medications administered as prescribed. 1/4NS infusing at 75ml/hr.   Patient has urinated, but has had no BM.  NG tube is being tolerated well.. Oral care provided. 250ml free water Q6H, last one at 1730.  Fall precautions in place, call bell in reach, bed in low and locked position.   Patient remains free from falls/injuries.   Patient denies needs at this time.    Will continue to monitor.

## 2019-03-23 PROBLEM — G93.41 ENCEPHALOPATHY, METABOLIC: Status: ACTIVE | Noted: 2019-03-23

## 2019-03-23 LAB
ALBUMIN SERPL BCP-MCNC: 2.5 G/DL
ANION GAP SERPL CALC-SCNC: 12 MMOL/L
ANION GAP SERPL CALC-SCNC: ABNORMAL MMOL/L
ANION GAP SERPL CALC-SCNC: ABNORMAL MMOL/L
ANISOCYTOSIS BLD QL SMEAR: SLIGHT
BASOPHILS # BLD AUTO: 0.02 K/UL
BASOPHILS NFR BLD: 0.1 %
BUN SERPL-MCNC: 79 MG/DL
BUN SERPL-MCNC: 80 MG/DL
BUN SERPL-MCNC: 81 MG/DL
BURR CELLS BLD QL SMEAR: ABNORMAL
CALCIUM SERPL-MCNC: 8.5 MG/DL
CALCIUM SERPL-MCNC: 8.7 MG/DL
CALCIUM SERPL-MCNC: 8.7 MG/DL
CHLORIDE SERPL-SCNC: 130 MMOL/L
CHLORIDE SERPL-SCNC: >130 MMOL/L
CHLORIDE SERPL-SCNC: >130 MMOL/L
CO2 SERPL-SCNC: 14 MMOL/L
CO2 SERPL-SCNC: 14 MMOL/L
CO2 SERPL-SCNC: 15 MMOL/L
CREAT SERPL-MCNC: 2.1 MG/DL
CREAT SERPL-MCNC: 2.2 MG/DL
CREAT SERPL-MCNC: 2.2 MG/DL
DACRYOCYTES BLD QL SMEAR: ABNORMAL
DIFFERENTIAL METHOD: ABNORMAL
EOSINOPHIL # BLD AUTO: 0.2 K/UL
EOSINOPHIL NFR BLD: 1 %
ERYTHROCYTE [DISTWIDTH] IN BLOOD BY AUTOMATED COUNT: 16.5 %
EST. GFR  (AFRICAN AMERICAN): 31 ML/MIN/1.73 M^2
EST. GFR  (AFRICAN AMERICAN): 31 ML/MIN/1.73 M^2
EST. GFR  (AFRICAN AMERICAN): 33 ML/MIN/1.73 M^2
EST. GFR  (NON AFRICAN AMERICAN): 27 ML/MIN/1.73 M^2
EST. GFR  (NON AFRICAN AMERICAN): 27 ML/MIN/1.73 M^2
EST. GFR  (NON AFRICAN AMERICAN): 28 ML/MIN/1.73 M^2
GLUCOSE SERPL-MCNC: 84 MG/DL
GLUCOSE SERPL-MCNC: 97 MG/DL
GLUCOSE SERPL-MCNC: 98 MG/DL
HCT VFR BLD AUTO: 43.6 %
HGB BLD-MCNC: 13 G/DL
LYMPHOCYTES # BLD AUTO: 1.9 K/UL
LYMPHOCYTES NFR BLD: 12.5 %
MCH RBC QN AUTO: 27.7 PG
MCHC RBC AUTO-ENTMCNC: 29.8 G/DL
MCV RBC AUTO: 93 FL
MONOCYTES # BLD AUTO: 1.3 K/UL
MONOCYTES NFR BLD: 8.9 %
NEUTROPHILS # BLD AUTO: 11.5 K/UL
NEUTROPHILS NFR BLD: 77.7 %
OVALOCYTES BLD QL SMEAR: ABNORMAL
PHOSPHATE SERPL-MCNC: 2.6 MG/DL
PLATELET # BLD AUTO: 150 K/UL
PLATELET BLD QL SMEAR: ABNORMAL
PMV BLD AUTO: 13 FL
POIKILOCYTOSIS BLD QL SMEAR: SLIGHT
POLYCHROMASIA BLD QL SMEAR: ABNORMAL
POTASSIUM SERPL-SCNC: 3.5 MMOL/L
POTASSIUM SERPL-SCNC: 3.6 MMOL/L
POTASSIUM SERPL-SCNC: 3.7 MMOL/L
RBC # BLD AUTO: 4.69 M/UL
SODIUM SERPL-SCNC: 156 MMOL/L
SODIUM SERPL-SCNC: 159 MMOL/L
SODIUM SERPL-SCNC: 160 MMOL/L
SPHEROCYTES BLD QL SMEAR: ABNORMAL
TARGETS BLD QL SMEAR: ABNORMAL
WBC # BLD AUTO: 14.9 K/UL

## 2019-03-23 PROCEDURE — 25000003 PHARM REV CODE 250: Performed by: INTERNAL MEDICINE

## 2019-03-23 PROCEDURE — 80048 BASIC METABOLIC PNL TOTAL CA: CPT

## 2019-03-23 PROCEDURE — 63600175 PHARM REV CODE 636 W HCPCS: Performed by: INTERNAL MEDICINE

## 2019-03-23 PROCEDURE — 99233 SBSQ HOSP IP/OBS HIGH 50: CPT | Mod: ,,, | Performed by: INTERNAL MEDICINE

## 2019-03-23 PROCEDURE — 99233 PR SUBSEQUENT HOSPITAL CARE,LEVL III: ICD-10-PCS | Mod: ,,, | Performed by: INTERNAL MEDICINE

## 2019-03-23 PROCEDURE — 80069 RENAL FUNCTION PANEL: CPT

## 2019-03-23 PROCEDURE — A4217 STERILE WATER/SALINE, 500 ML: HCPCS | Performed by: INTERNAL MEDICINE

## 2019-03-23 PROCEDURE — 85025 COMPLETE CBC W/AUTO DIFF WBC: CPT

## 2019-03-23 PROCEDURE — 36415 COLL VENOUS BLD VENIPUNCTURE: CPT

## 2019-03-23 PROCEDURE — 21400001 HC TELEMETRY ROOM

## 2019-03-23 RX ORDER — SODIUM BICARBONATE 650 MG/1
650 TABLET ORAL 3 TIMES DAILY
Status: DISCONTINUED | OUTPATIENT
Start: 2019-03-23 | End: 2019-03-26 | Stop reason: HOSPADM

## 2019-03-23 RX ORDER — METOPROLOL TARTRATE 25 MG/1
25 TABLET, FILM COATED ORAL DAILY
Status: DISCONTINUED | OUTPATIENT
Start: 2019-03-23 | End: 2019-03-23

## 2019-03-23 RX ORDER — DIVALPROEX SODIUM 125 MG/1
500 CAPSULE, COATED PELLETS ORAL DAILY
Status: DISCONTINUED | OUTPATIENT
Start: 2019-03-23 | End: 2019-03-26 | Stop reason: HOSPADM

## 2019-03-23 RX ORDER — METOPROLOL TARTRATE 25 MG/1
25 TABLET, FILM COATED ORAL 2 TIMES DAILY
Status: DISCONTINUED | OUTPATIENT
Start: 2019-03-23 | End: 2019-03-26 | Stop reason: HOSPADM

## 2019-03-23 RX ADMIN — MEMANTINE HYDROCHLORIDE 5 MG: 5 TABLET ORAL at 10:03

## 2019-03-23 RX ADMIN — CEFEPIME 2 G: 2 INJECTION, POWDER, FOR SOLUTION INTRAVENOUS at 02:03

## 2019-03-23 RX ADMIN — DIVALPROEX SODIUM 500 MG: 125 CAPSULE, COATED PELLETS ORAL at 10:03

## 2019-03-23 RX ADMIN — SODIUM BICARBONATE 650 MG TABLET 650 MG: at 09:03

## 2019-03-23 RX ADMIN — VENLAFAXINE 150 MG: 75 TABLET ORAL at 10:03

## 2019-03-23 RX ADMIN — OLANZAPINE 5 MG: 5 TABLET, FILM COATED ORAL at 09:03

## 2019-03-23 RX ADMIN — METOPROLOL TARTRATE 25 MG: 25 TABLET ORAL at 09:03

## 2019-03-23 RX ADMIN — PRAVASTATIN SODIUM 20 MG: 20 TABLET ORAL at 10:03

## 2019-03-23 RX ADMIN — TAMSULOSIN HYDROCHLORIDE 0.4 MG: 0.4 CAPSULE ORAL at 10:03

## 2019-03-23 RX ADMIN — METOPROLOL TARTRATE 25 MG: 25 TABLET ORAL at 10:03

## 2019-03-23 RX ADMIN — SODIUM BICARBONATE 650 MG TABLET 650 MG: at 02:03

## 2019-03-23 RX ADMIN — SODIUM CHLORIDE: 234 INJECTION INTRAMUSCULAR; INTRAVENOUS; SUBCUTANEOUS at 05:03

## 2019-03-23 RX ADMIN — ENOXAPARIN SODIUM 30 MG: 100 INJECTION SUBCUTANEOUS at 04:03

## 2019-03-23 RX ADMIN — MEMANTINE HYDROCHLORIDE 5 MG: 5 TABLET ORAL at 09:03

## 2019-03-23 NOTE — ASSESSMENT & PLAN NOTE
Will use D5 water ,  Nephrology consult .  Will correct water deficit .  Will need close monitoring to over over rapid correction.  Serum sodium was normal on 03.06  3/22 - no improvement - IV fluids changed to sterile water  Also, NGT placed for free water flushes every 6 hours  BMP every 6 hours  3/22- will continue correction of serum sodium, nephrology on board.  On serile water with 23% NACL , serum sodium is 162, continue water flushes

## 2019-03-23 NOTE — ASSESSMENT & PLAN NOTE
Will use D5 water ,  Nephrology consult .  Will correct water deficit .  Will need close monitoring to over over rapid correction.  Serum sodium was normal on 03.06  3/22 - no improvement - IV fluids changed to sterile water  Also, NGT placed for free water flushes every 6 hours  BMP every 6 hours  3/22- will continue correction of serum sodium, nephrology on board.  On serile water with 23% NACL , serum sodium is 162, continue water flushes  03/23 -serum sodium is now 159 , will continue close monitoring of serum sodium and renal function

## 2019-03-23 NOTE — SUBJECTIVE & OBJECTIVE
Interval History: 3/22- remains weak and non verbal, serum sodium is now 162.ABG showed PH -7.49 with Pco2- 23, Co2- 11      Review of Systems   Unable to perform ROS: Acuity of condition     Objective:     Vital Signs (Most Recent):  Temp: 98.9 °F (37.2 °C) (03/22/19 2335)  Pulse: 96 (03/22/19 2335)  Resp: 16 (03/22/19 2335)  BP: 133/63 (03/22/19 2335)  SpO2: 97 % (03/22/19 2335) Vital Signs (24h Range):  Temp:  [98.5 °F (36.9 °C)-99.9 °F (37.7 °C)] 98.9 °F (37.2 °C)  Pulse:  [] 96  Resp:  [16-44] 16  SpO2:  [89 %-97 %] 97 %  BP: (111-163)/(62-83) 133/63     Weight: 60 kg (132 lb 4.4 oz)  Body mass index is 20.72 kg/m².    Intake/Output Summary (Last 24 hours) at 3/23/2019 0116  Last data filed at 3/22/2019 2305  Gross per 24 hour   Intake 2658.33 ml   Output --   Net 2658.33 ml      Physical Exam   Constitutional:   Cachetic, non verbal   HENT:   Mouth/Throat: Mucous membranes are dry.   Dry, cracked tongue and oral MM   Eyes: EOM are normal. Pupils are unequal.   Left pupil irregular   Neck: Normal range of motion. Neck supple.   Cardiovascular: Normal rate and regular rhythm.   Pulmonary/Chest: Effort normal and breath sounds normal.   Abdominal: Soft. Bowel sounds are normal.   NGT placed   Musculoskeletal: He exhibits no edema.   Contracted    Neurological:   No verbal    Skin: Skin is warm and dry.   Psychiatric:   Non verbal    Nursing note and vitals reviewed.       Significant Labs:   Blood Culture: No results for input(s): LABBLOO in the last 48 hours.  CBC:   Recent Labs   Lab 03/21/19  0525 03/22/19  0419   WBC 15.20* 16.09*   HGB 13.8* 12.9*   HCT 46.2 43.0    207     Urine Culture: No results for input(s): LABURIN in the last 48 hours.  All pertinent labs within the past 24 hours have been reviewed.    Significant Imaging: I have reviewed and interpreted all pertinent imaging results/findings within the past 24 hours.

## 2019-03-23 NOTE — ASSESSMENT & PLAN NOTE
84 y/o male with change in MS and hypernatremia:         * Acute hypernatremia     Severe hypernatremia due to dehydration  Has improved somewhat with hypotonic IVF's  Mgmt reviewed  s Na still high  Will increase rate of IVF's to 100 ml/hr  Will add water by NG tube at 300 ml q 6 hours.         ARIADNA (acute kidney injury)     ARIADNA. Due to dehydration.  Baseline s Cr not known.  May have CKD. Repeat labs will clarify  s C r has improved with IVF's. Stable, not worse.      Dementia     Baseline dementia  Baseline MS not known  Presented with change in mental status, likely due to severe hypernatremia         Plans and recommendations:   As detailed above

## 2019-03-23 NOTE — SUBJECTIVE & OBJECTIVE
Interval History:   3/23- he remains non verbal, NG tube was repositioned , serum sodium is now 159        Review of Systems   Unable to perform ROS: Mental status change     Objective:     Vital Signs (Most Recent):  Temp: 99.3 °F (37.4 °C) (03/23/19 0707)  Pulse: (!) 111 (03/23/19 0707)  Resp: (!) 28 (03/23/19 0707)  BP: (!) 146/66 (03/23/19 0707)  SpO2: (!) 92 % (03/23/19 0707) Vital Signs (24h Range):  Temp:  [97.7 °F (36.5 °C)-99.3 °F (37.4 °C)] 99.3 °F (37.4 °C)  Pulse:  [] 111  Resp:  [16-28] 28  SpO2:  [92 %-97 %] 92 %  BP: (111-163)/(62-83) 146/66     Weight: 60 kg (132 lb 4.4 oz)  Body mass index is 20.72 kg/m².    Intake/Output Summary (Last 24 hours) at 3/23/2019 1117  Last data filed at 3/23/2019 0600  Gross per 24 hour   Intake 1800 ml   Output --   Net 1800 ml      Physical Exam   Constitutional:   Cachetic, non verbal   HENT:   Mouth/Throat: Mucous membranes are dry.   Dry, cracked tongue and oral MM   Eyes: EOM are normal. Pupils are unequal.   Left pupil irregular   Neck: Normal range of motion. Neck supple.   Cardiovascular: Normal rate and regular rhythm.   Pulmonary/Chest: Effort normal and breath sounds normal.   Abdominal: Soft. Bowel sounds are normal.   NGT placed   Musculoskeletal: He exhibits no edema.   Contracted    Neurological:   No verbal    Skin: Skin is warm and dry.   Psychiatric:   Non verbal    Nursing note and vitals reviewed.      Significant Labs:   BMP:   Recent Labs   Lab 03/23/19  0800   GLU 97   *   K 3.5   CL >130*   CO2 14*   BUN 79*   CREATININE 2.1*   CALCIUM 8.7     CBC:   Recent Labs   Lab 03/22/19  0419 03/23/19  0513   WBC 16.09* 14.90*   HGB 12.9* 13.0*   HCT 43.0 43.6    150     All pertinent labs within the past 24 hours have been reviewed.    Significant Imaging: I have reviewed and interpreted all pertinent imaging results/findings within the past 24 hours.

## 2019-03-23 NOTE — ASSESSMENT & PLAN NOTE
Likely pre renal, will continue hydration , needs close monitoring of serum creatine  3/21 - no significant improvement     3/23- serum creatinine remains stable at 2.1

## 2019-03-23 NOTE — SUBJECTIVE & OBJECTIVE
Interval History: Pt was seen and examined. No new events last pm, no overall change, pt is non-verbal.    Review of patient's allergies indicates:   Allergen Reactions    Augmentin [amoxicillin-pot clavulanate]      Tolerates cephalosporins     Current Facility-Administered Medications   Medication Frequency    acetaminophen tablet 650 mg Q4H PRN    cefepime 2 g in dextrose 5% 50 mL IVPB (ready to mix system) Q24H    divalproex capsule 500 mg Daily    enoxaparin injection 30 mg Daily    influenza (FLUZONE HIGH-DOSE) vaccine 0.5 mL vaccine x 1 dose    memantine tablet 5 mg BID    metoprolol tartrate (LOPRESSOR) tablet 25 mg BID    OLANZapine tablet 5 mg QHS    pneumoc 13-konstantin conj-dip cr(PF) (PREVNAR 13 (PF)) 0.5 mL vaccine x 1 dose    pravastatin tablet 20 mg Daily    sterile water 1,000 mL with sodium chloride (23.4%) 4 mEq/mL 37.52 mEq infusion Continuous    tamsulosin 24 hr capsule 0.4 mg Daily    venlafaxine tablet 150 mg Daily       Objective:     Vital Signs (Most Recent):  Temp: 99.3 °F (37.4 °C) (03/23/19 0707)  Pulse: 60 (03/23/19 1119)  Resp: 18 (03/23/19 1119)  BP: (!) 144/78 (03/23/19 1119)  SpO2: 95 % (03/23/19 1119)  O2 Device (Oxygen Therapy): room air (03/23/19 1119) Vital Signs (24h Range):  Temp:  [97.7 °F (36.5 °C)-99.3 °F (37.4 °C)] 99.3 °F (37.4 °C)  Pulse:  [] 60  Resp:  [16-28] 18  SpO2:  [92 %-97 %] 95 %  BP: (111-146)/(62-78) 144/78     Weight: 60 kg (132 lb 4.4 oz) (03/22/19 0356)  Body mass index is 20.72 kg/m².  Body surface area is 1.68 meters squared.    I/O last 3 completed shifts:  In: 3850 [P.O.:500; I.V.:2100; NG/GT:1250]  Out: -     Physical Exam   Constitutional: He is oriented to person, place, and time. He appears well-developed.   Looks thin and malnourished   HENT:   Head: Normocephalic and atraumatic.   Neck: No JVD present.   Cardiovascular: Normal rate, regular rhythm and normal heart sounds. Exam reveals no friction rub.   Pulmonary/Chest: Effort normal  and breath sounds normal. No respiratory distress. He has no rales.   Abdominal: Soft. Bowel sounds are normal. He exhibits no distension. There is no guarding.   Musculoskeletal: He exhibits no edema.   Neurological: He is alert and oriented to person, place, and time.   Skin: Skin is warm and dry.   Psychiatric: He has a normal mood and affect. His behavior is normal.   Nursing note and vitals reviewed.      Significant Labs: reviewed  BMP  Lab Results   Component Value Date     (H) 03/23/2019    K 3.5 03/23/2019    CL >130 (HH) 03/23/2019    CO2 14 (L) 03/23/2019    BUN 79 (H) 03/23/2019    CREATININE 2.1 (H) 03/23/2019    CALCIUM 8.7 03/23/2019    ANIONGAP Unable to calculate 03/23/2019    ESTGFRAFRICA 33 (A) 03/23/2019    EGFRNONAA 28 (A) 03/23/2019

## 2019-03-23 NOTE — PROGRESS NOTES
[facility] Hospital Medicine  Progress Note    Patient Name: David Montero  MRN: [id number]  Patient Class: IP- Inpatient   Admission Date: 3/20/2019  Length of Stay: 3 days  Attending Physician: David Sidhu MD  Primary Care Provider: Steve Weeks MD        Subjective:     Principal Problem:Hypernatremia    HPI:  Patient has altered mental status -history from electronic chart .  a 83 y.o. male patient with history of CAD, AAA, Alzheimer's Dementia, chronic UTI, HTN, presents to the Emergency Department for AMS.  He is a resident of   [facility].     He had recent serum sodium done at [facility]  3/6/2019 3/5/2019 3/4/2019 3/3/2019 3/2/2019 3/1/2019 3/1/2019 2/28/2019 2/28/2019 2/27/2019 2/27/2019 2/27/2019 2/26/2019 12/18/2018 12/17/2018 12/17/2018 12/16/2018 12/16/2018 12/15/2018 12/15/2018 12/14/2018 12/14/2018 12/13/2018 12/13/2018 12/12/2018 11/6/2018 9/3/2018 6/4/2018 3/2/2018 3/1/2018 2/28/2018 2/27/2018 2/26/2018 2/12/2018 12/20/2017 12/15/2017 12/1/2017 9/1/2017 6/1/2017 3/7/2017 5/15/2015 5/14/2015 5/13/2015     140 140 137 140 139     Since admission , serum sodium is 175. He is non verbal   Head CT scan -   There is no evidence of an acute ischemic event.  Previous records from NH- he has no family and is full code.      Hospital Course:  Pt is a nursing home resident with Alzheimer's Dementia, bedbound with contractures. Admitted as his sodium level = 175 and chloride > 130. Pt was nonverbal and according to nursing home needs to be an DNR however, there is no family available. Nephrology assisting with care. IV fluids consisted of sterile water with low sodium chloride content. A NGT was placed and free water flushes were prescribed every 6 hours. BMP ordered every 12 hours. Pt was designated an DNR by Marcos Sidhu and Wolfgang. Empiric cefepime given for leukocytosis with no clear source.     3/22- remains weak and non verbal, serum sodium is now 162.ABG showed PH -7.49 with Pco2- 23,  Co2- 11    3/23- he remains non verbal, NG tube was repositioned , serum sodium is now 159      Interval History:   3/23- he remains non verbal, NG tube was repositioned , serum sodium is now 159        Review of Systems   Unable to perform ROS: Mental status change     Objective:     Vital Signs (Most Recent):  Temp: 99.3 °F (37.4 °C) (03/23/19 0707)  Pulse: (!) 111 (03/23/19 0707)  Resp: (!) 28 (03/23/19 0707)  BP: (!) 146/66 (03/23/19 0707)  SpO2: (!) 92 % (03/23/19 0707) Vital Signs (24h Range):  Temp:  [97.7 °F (36.5 °C)-99.3 °F (37.4 °C)] 99.3 °F (37.4 °C)  Pulse:  [] 111  Resp:  [16-28] 28  SpO2:  [92 %-97 %] 92 %  BP: (111-163)/(62-83) 146/66     Weight: 60 kg (132 lb 4.4 oz)  Body mass index is 20.72 kg/m².    Intake/Output Summary (Last 24 hours) at 3/23/2019 1117  Last data filed at 3/23/2019 0600  Gross per 24 hour   Intake 1800 ml   Output --   Net 1800 ml      Physical Exam   Constitutional:   Cachetic, non verbal   HENT:   Mouth/Throat: Mucous membranes are dry.   Dry, cracked tongue and oral MM   Eyes: EOM are normal. Pupils are unequal.   Left pupil irregular   Neck: Normal range of motion. Neck supple.   Cardiovascular: Normal rate and regular rhythm.   Pulmonary/Chest: Effort normal and breath sounds normal.   Abdominal: Soft. Bowel sounds are normal.   NGT placed   Musculoskeletal: He exhibits no edema.   Contracted    Neurological:   No verbal    Skin: Skin is warm and dry.   Psychiatric:   Non verbal    Nursing note and vitals reviewed.      Significant Labs:   BMP:   Recent Labs   Lab 03/23/19  0800   GLU 97   *   K 3.5   CL >130*   CO2 14*   BUN 79*   CREATININE 2.1*   CALCIUM 8.7     CBC:   Recent Labs   Lab 03/22/19  0419 03/23/19  0513   WBC 16.09* 14.90*   HGB 12.9* 13.0*   HCT 43.0 43.6    150     All pertinent labs within the past 24 hours have been reviewed.    Significant Imaging: I have reviewed and interpreted all pertinent imaging results/findings within the past  24 hours.    Assessment/Plan:      * Hypernatremia      Will use D5 water ,  Nephrology consult .  Will correct water deficit .  Will need close monitoring to over over rapid correction.  Serum sodium was normal on 03.06  3/22 - no improvement - IV fluids changed to sterile water  Also, NGT placed for free water flushes every 6 hours  BMP every 6 hours  3/22- will continue correction of serum sodium, nephrology on board.  On serile water with 23% NACL , serum sodium is 162, continue water flushes  03/23 -serum sodium is now 159 , will continue close monitoring of serum sodium and renal function        Encephalopathy, metabolic      3/22- related to hypernatremia and baseline dementia-will continue correction of hypernatremia  3/23- will continue supportive care and close monitoring of serum sodium     Altered mental status           Leucocytosis      Will follow cultures to guide therapy , on cefepime  Blood cultures - NGTD  Urine culture results pending  Pt has been afebrile       ARIADNA (acute kidney injury)      Likely pre renal, will continue hydration , needs close monitoring of serum creatine  3/21 - no significant improvement     3/23- serum creatinine remains stable at 2.1     Dementia      Supportive, needs to be DNR, has no family , will plan to do physician directed DNR   3/21- Physician directed DNR by Marcos Sidhu and Wolfgang  3/22- will continue supportive care     Bladder cancer      Continue supportive care ,out patient follow up        VTE Risk Mitigation (From admission, onward)        Ordered     enoxaparin injection 30 mg  Daily      03/22/19 0996              David Sidhu MD  Department of Hospital Medicine   [facility]

## 2019-03-23 NOTE — PLAN OF CARE
Problem: Adult Inpatient Plan of Care  Goal: Plan of Care Review  Outcome: Ongoing (interventions implemented as appropriate)  Patient AAOX3. No c/o pain at this time. Patient NSR during shift on Tele monitor. IV fluids continued  Per MD order.  IV antibiotics continued. Patient Turn Q2H  NG tube replaced during shift marked at 60cm.  Patient's vitals wnl during shift.Will continue to monitor patient

## 2019-03-23 NOTE — ASSESSMENT & PLAN NOTE
3/22- related to hypernatremia and baseline dementia-will continue correction of hypernatremia  3/23- will continue supportive care and close monitoring of serum sodium

## 2019-03-23 NOTE — PROGRESS NOTES
[facility] Hospital Medicine  Progress Note    Patient Name: David Montero  MRN: [id number]  Patient Class: IP- Inpatient   Admission Date: 3/20/2019  Length of Stay: 3 days  Attending Physician: David Sidhu MD  Primary Care Provider: Steve Weeks MD        Subjective:     Principal Problem:Hypernatremia    HPI:  Patient has altered mental status -history from electronic chart .  a 83 y.o. male patient with history of CAD, AAA, Alzheimer's Dementia, chronic UTI, HTN, presents to the Emergency Department for AMS.  He is a resident of   [facility].     He had recent serum sodium done at [facility]  3/6/2019 3/5/2019 3/4/2019 3/3/2019 3/2/2019 3/1/2019 3/1/2019 2/28/2019 2/28/2019 2/27/2019 2/27/2019 2/27/2019 2/26/2019 12/18/2018 12/17/2018 12/17/2018 12/16/2018 12/16/2018 12/15/2018 12/15/2018 12/14/2018 12/14/2018 12/13/2018 12/13/2018 12/12/2018 11/6/2018 9/3/2018 6/4/2018 3/2/2018 3/1/2018 2/28/2018 2/27/2018 2/26/2018 2/12/2018 12/20/2017 12/15/2017 12/1/2017 9/1/2017 6/1/2017 3/7/2017 5/15/2015 5/14/2015 5/13/2015     140 140 137 140 139     Since admission , serum sodium is 175. He is non verbal   Head CT scan -   There is no evidence of an acute ischemic event.  Previous records from NH- he has no family and is full code.      Hospital Course:  Pt is a nursing home resident with Alzheimer's Dementia, bedbound with contractures. Admitted as his sodium level = 175 and chloride > 130. Pt was nonverbal and according to nursing home needs to be an DNR however, there is no family available. Nephrology assisting with care. IV fluids consisted of sterile water with low sodium chloride content. A NGT was placed and free water flushes were prescribed every 6 hours. BMP ordered every 12 hours. Pt was designated an DNR by Marcos Sidhu and Wolfgang. Empiric cefepime given for leukocytosis with no clear source.     3/22- remains weak and non verbal, serum sodium is now 162.ABG showed PH -7.49 with Pco2- 23,  Co2- 11    Interval History: 3/22- remains weak and non verbal, serum sodium is now 162.ABG showed PH -7.49 with Pco2- 23, Co2- 11      Review of Systems   Unable to perform ROS: Acuity of condition     Objective:     Vital Signs (Most Recent):  Temp: 98.9 °F (37.2 °C) (03/22/19 2335)  Pulse: 96 (03/22/19 2335)  Resp: 16 (03/22/19 2335)  BP: 133/63 (03/22/19 2335)  SpO2: 97 % (03/22/19 2335) Vital Signs (24h Range):  Temp:  [98.5 °F (36.9 °C)-99.9 °F (37.7 °C)] 98.9 °F (37.2 °C)  Pulse:  [] 96  Resp:  [16-44] 16  SpO2:  [89 %-97 %] 97 %  BP: (111-163)/(62-83) 133/63     Weight: 60 kg (132 lb 4.4 oz)  Body mass index is 20.72 kg/m².    Intake/Output Summary (Last 24 hours) at 3/23/2019 0116  Last data filed at 3/22/2019 2305  Gross per 24 hour   Intake 2658.33 ml   Output --   Net 2658.33 ml      Physical Exam   Constitutional:   Cachetic, non verbal   HENT:   Mouth/Throat: Mucous membranes are dry.   Dry, cracked tongue and oral MM   Eyes: EOM are normal. Pupils are unequal.   Left pupil irregular   Neck: Normal range of motion. Neck supple.   Cardiovascular: Normal rate and regular rhythm.   Pulmonary/Chest: Effort normal and breath sounds normal.   Abdominal: Soft. Bowel sounds are normal.   NGT placed   Musculoskeletal: He exhibits no edema.   Contracted    Neurological:   No verbal    Skin: Skin is warm and dry.   Psychiatric:   Non verbal    Nursing note and vitals reviewed.       Significant Labs:   Blood Culture: No results for input(s): LABBLOO in the last 48 hours.  CBC:   Recent Labs   Lab 03/21/19  0525 03/22/19  0419   WBC 15.20* 16.09*   HGB 13.8* 12.9*   HCT 46.2 43.0    207     Urine Culture: No results for input(s): LABURIN in the last 48 hours.  All pertinent labs within the past 24 hours have been reviewed.    Significant Imaging: I have reviewed and interpreted all pertinent imaging results/findings within the past 24 hours.    Assessment/Plan:      * Hypernatremia      Will use D5  water ,  Nephrology consult .  Will correct water deficit .  Will need close monitoring to over over rapid correction.  Serum sodium was normal on 03.06  3/22 - no improvement - IV fluids changed to sterile water  Also, NGT placed for free water flushes every 6 hours  BMP every 6 hours  3/22- will continue correction of serum sodium, nephrology on board.  On serile water with 23% NACL , serum sodium is 162, continue water flushes       Encephalopathy, metabolic      3/22- related to hypernatremia and baseline dementia-will continue correction of hypernatremia     Altered mental status           Leucocytosis      Will follow cultures to guide therapy , on cefepime  Blood cultures - NGTD  Urine culture results pending  Pt has been afebrile     ARIADNA (acute kidney injury)      Likely pre renal, will continue hydration , needs close monitoring of serum creatine  3/21 - no significant improvement        Dementia      Supportive, needs to be DNR, has no family , will plan to do physician directed DNR   3/21- Physician directed DNR by Marcos Sidhu and Wolfgang  3/22- will continue supportive care     Bladder cancer      Continue supportive care ,out patient follow up        VTE Risk Mitigation (From admission, onward)        Ordered     enoxaparin injection 30 mg  Daily      03/22/19 3093              David Sidhu MD  Department of Hospital Medicine   [facility]

## 2019-03-23 NOTE — ASSESSMENT & PLAN NOTE
Supportive, needs to be DNR, has no family , will plan to do physician directed DNR   3/21- Physician directed DNR by Marcos Sidhu and Wolfgang  3/22- will continue supportive care

## 2019-03-23 NOTE — PROGRESS NOTES
"[facility]   Nephrology  Progress Note    Patient Name: David Montero  MRN: [id number]  Admission Date: 3/20/2019  Hospital Length of Stay: 3 days  Attending Provider: David Sidhu MD   Primary Care Physician: Steve Weeks MD  Principal Problem:Hypernatremia    Subjective:     HPI: 83 year old male with dementia, CAD, h/o bladder cancer, HTN, hyperlipidemia, BPH, femoral artery aneurysm, AAA, GERD, s/p CVA, chronic UTI presents to [facility] with "altered mental status". Patient was transferred from [facility]. Work-up revealed hypernatremia (Na 172) and ARIADNA (creatinine has increased from 1.1 on 3/6/19 to 3.1 on 3/20/19). Also with leucocytosis (WBC 14.2). He received 2 liters of IV fluids in ER.   Nephrology was consulted to help with patient's renal and electrolyte care. Patient was seen and examined in his hospital room. Patient is demented and currently nonverbal. He is not able to provide any additional history.   Chart review revealed that patient is seen at [facility]. Labs from 3/6/19 revealed Na of 140, K-5.3, CO2 of 18, creatinine of 1.1, Calcium of 9.2, WBC of 6.1, Hgb of 14.3, platelets of 262. Urinalysis from 2/26/19 showed 100 protein, > 100 WBC, 5-10 RBC, + LE. ECHO from 3/2/19 revealed EF of 48%.     Interval History: Pt was seen and examined. No new events last pm, no overall change, pt is non-verbal.    Review of patient's allergies indicates:   Allergen Reactions    Augmentin [amoxicillin-pot clavulanate]      Tolerates cephalosporins     Current Facility-Administered Medications   Medication Frequency    acetaminophen tablet 650 mg Q4H PRN    cefepime 2 g in dextrose 5% 50 mL IVPB (ready to mix system) Q24H    divalproex capsule 500 mg Daily    enoxaparin injection 30 mg Daily    influenza (FLUZONE HIGH-DOSE) vaccine 0.5 mL vaccine x 1 dose    memantine tablet 5 mg BID    metoprolol tartrate (LOPRESSOR) tablet 25 mg BID    OLANZapine tablet 5 mg QHS    pneumoc 13-konstantin conj-dip " cr(PF) (PREVNAR 13 (PF)) 0.5 mL vaccine x 1 dose    pravastatin tablet 20 mg Daily    sterile water 1,000 mL with sodium chloride (23.4%) 4 mEq/mL 37.52 mEq infusion Continuous    tamsulosin 24 hr capsule 0.4 mg Daily    venlafaxine tablet 150 mg Daily       Objective:     Vital Signs (Most Recent):  Temp: 99.3 °F (37.4 °C) (03/23/19 0707)  Pulse: 60 (03/23/19 1119)  Resp: 18 (03/23/19 1119)  BP: (!) 144/78 (03/23/19 1119)  SpO2: 95 % (03/23/19 1119)  O2 Device (Oxygen Therapy): room air (03/23/19 1119) Vital Signs (24h Range):  Temp:  [97.7 °F (36.5 °C)-99.3 °F (37.4 °C)] 99.3 °F (37.4 °C)  Pulse:  [] 60  Resp:  [16-28] 18  SpO2:  [92 %-97 %] 95 %  BP: (111-146)/(62-78) 144/78     Weight: 60 kg (132 lb 4.4 oz) (03/22/19 0356)  Body mass index is 20.72 kg/m².  Body surface area is 1.68 meters squared.    I/O last 3 completed shifts:  In: 3850 [P.O.:500; I.V.:2100; NG/GT:1250]  Out: -     Physical Exam   Constitutional: He is oriented to person, place, and time. He appears well-developed.   Looks thin and malnourished   HENT:   Head: Normocephalic and atraumatic.   Neck: No JVD present.   Cardiovascular: Normal rate, regular rhythm and normal heart sounds. Exam reveals no friction rub.   Pulmonary/Chest: Effort normal and breath sounds normal. No respiratory distress. He has no rales.   Abdominal: Soft. Bowel sounds are normal. He exhibits no distension. There is no guarding.   Musculoskeletal: He exhibits no edema.   Neurological: He is alert and oriented to person, place, and time.   Skin: Skin is warm and dry.   Psychiatric: He has a normal mood and affect. His behavior is normal.   Nursing note and vitals reviewed.      Significant Labs: reviewed  BMP  Lab Results   Component Value Date     (H) 03/23/2019    K 3.5 03/23/2019    CL >130 (HH) 03/23/2019    CO2 14 (L) 03/23/2019    BUN 79 (H) 03/23/2019    CREATININE 2.1 (H) 03/23/2019    CALCIUM 8.7 03/23/2019    ANIONGAP Unable to calculate  03/23/2019    ESTGFRAFRICA 33 (A) 03/23/2019    EGFRNONAA 28 (A) 03/23/2019         Assessment/Plan:         82 y/o male with change in MS and hypernatremia:         * Acute hypernatremia     Severe hypernatremia due to dehydration  Improved (162 to 159) since yesterday after adding water by OG tube  Mgmt reviewed  Will increase rate of IVF's to 100 ml/hr  Will increase water by NG tube at from 300 to 450 ml q 6 hours.         ARIADNA (acute kidney injury)     ARIADNA. Due to dehydration.  Baseline s Cr not known.  s Cr lower today, ARIADNA improving   May have CKD. Stage not known. Repeat labs will clarify  K normal ro borderline low  Noted metabolic acidosis, signifies likely CKD  Will add Na bicarbonate by OG tube      Dementia     Baseline dementia  Baseline MS not known  Presented with change in mental status, likely due to severe hypernatremia         Plans and recommendations:   As detailed above           Rommel Tellez MD  Nephrology  [facility]

## 2019-03-24 LAB
ALBUMIN SERPL BCP-MCNC: 2.3 G/DL (ref 3.5–5.2)
ANION GAP SERPL CALC-SCNC: 12 MMOL/L (ref 8–16)
ANION GAP SERPL CALC-SCNC: 16 MMOL/L (ref 8–16)
BASOPHILS # BLD AUTO: 0.02 K/UL (ref 0–0.2)
BASOPHILS NFR BLD: 0.1 % (ref 0–1.9)
BUN SERPL-MCNC: 74 MG/DL (ref 8–23)
BUN SERPL-MCNC: 77 MG/DL (ref 8–23)
CALCIUM SERPL-MCNC: 8.3 MG/DL (ref 8.7–10.5)
CALCIUM SERPL-MCNC: 8.3 MG/DL (ref 8.7–10.5)
CHLORIDE SERPL-SCNC: 123 MMOL/L (ref 95–110)
CHLORIDE SERPL-SCNC: 128 MMOL/L (ref 95–110)
CO2 SERPL-SCNC: 14 MMOL/L (ref 23–29)
CO2 SERPL-SCNC: 15 MMOL/L (ref 23–29)
CREAT SERPL-MCNC: 2.1 MG/DL (ref 0.5–1.4)
CREAT SERPL-MCNC: 2.2 MG/DL (ref 0.5–1.4)
DIFFERENTIAL METHOD: ABNORMAL
EOSINOPHIL # BLD AUTO: 0.3 K/UL (ref 0–0.5)
EOSINOPHIL NFR BLD: 1.9 % (ref 0–8)
ERYTHROCYTE [DISTWIDTH] IN BLOOD BY AUTOMATED COUNT: 16.4 % (ref 11.5–14.5)
EST. GFR  (AFRICAN AMERICAN): 31 ML/MIN/1.73 M^2
EST. GFR  (AFRICAN AMERICAN): 33 ML/MIN/1.73 M^2
EST. GFR  (NON AFRICAN AMERICAN): 27 ML/MIN/1.73 M^2
EST. GFR  (NON AFRICAN AMERICAN): 28 ML/MIN/1.73 M^2
GLUCOSE SERPL-MCNC: 80 MG/DL (ref 70–110)
GLUCOSE SERPL-MCNC: 85 MG/DL (ref 70–110)
HCT VFR BLD AUTO: 38.6 % (ref 40–54)
HGB BLD-MCNC: 11.8 G/DL (ref 14–18)
LYMPHOCYTES # BLD AUTO: 2 K/UL (ref 1–4.8)
LYMPHOCYTES NFR BLD: 14.9 % (ref 18–48)
MCH RBC QN AUTO: 27.4 PG (ref 27–31)
MCHC RBC AUTO-ENTMCNC: 30.6 G/DL (ref 32–36)
MCV RBC AUTO: 90 FL (ref 82–98)
MONOCYTES # BLD AUTO: 1.2 K/UL (ref 0.3–1)
MONOCYTES NFR BLD: 9.1 % (ref 4–15)
NEUTROPHILS # BLD AUTO: 9.9 K/UL (ref 1.8–7.7)
NEUTROPHILS NFR BLD: 74.3 % (ref 38–73)
PHOSPHATE SERPL-MCNC: 3 MG/DL (ref 2.7–4.5)
PLATELET # BLD AUTO: 144 K/UL (ref 150–350)
PMV BLD AUTO: ABNORMAL FL (ref 9.2–12.9)
POTASSIUM SERPL-SCNC: 3.3 MMOL/L (ref 3.5–5.1)
POTASSIUM SERPL-SCNC: 3.6 MMOL/L (ref 3.5–5.1)
RBC # BLD AUTO: 4.31 M/UL (ref 4.6–6.2)
SODIUM SERPL-SCNC: 154 MMOL/L (ref 136–145)
SODIUM SERPL-SCNC: 154 MMOL/L (ref 136–145)
WBC # BLD AUTO: 13.42 K/UL (ref 3.9–12.7)

## 2019-03-24 PROCEDURE — 25000003 PHARM REV CODE 250: Performed by: EMERGENCY MEDICINE

## 2019-03-24 PROCEDURE — A4217 STERILE WATER/SALINE, 500 ML: HCPCS | Performed by: INTERNAL MEDICINE

## 2019-03-24 PROCEDURE — 63600175 PHARM REV CODE 636 W HCPCS: Performed by: INTERNAL MEDICINE

## 2019-03-24 PROCEDURE — 99233 SBSQ HOSP IP/OBS HIGH 50: CPT | Mod: ,,, | Performed by: INTERNAL MEDICINE

## 2019-03-24 PROCEDURE — 36415 COLL VENOUS BLD VENIPUNCTURE: CPT

## 2019-03-24 PROCEDURE — 99233 PR SUBSEQUENT HOSPITAL CARE,LEVL III: ICD-10-PCS | Mod: ,,, | Performed by: INTERNAL MEDICINE

## 2019-03-24 PROCEDURE — 80048 BASIC METABOLIC PNL TOTAL CA: CPT

## 2019-03-24 PROCEDURE — 80069 RENAL FUNCTION PANEL: CPT

## 2019-03-24 PROCEDURE — 85025 COMPLETE CBC W/AUTO DIFF WBC: CPT

## 2019-03-24 PROCEDURE — 21400001 HC TELEMETRY ROOM

## 2019-03-24 RX ORDER — ACETAMINOPHEN 650 MG/1
650 SUPPOSITORY RECTAL EVERY 6 HOURS PRN
Status: DISCONTINUED | OUTPATIENT
Start: 2019-03-24 | End: 2019-03-26 | Stop reason: HOSPADM

## 2019-03-24 RX ORDER — DEXTROSE MONOHYDRATE 50 MG/ML
INJECTION, SOLUTION INTRAVENOUS CONTINUOUS
Status: DISCONTINUED | OUTPATIENT
Start: 2019-03-24 | End: 2019-03-25

## 2019-03-24 RX ADMIN — SODIUM CHLORIDE: 234 INJECTION INTRAMUSCULAR; INTRAVENOUS; SUBCUTANEOUS at 12:03

## 2019-03-24 RX ADMIN — SODIUM CHLORIDE: 234 INJECTION INTRAMUSCULAR; INTRAVENOUS; SUBCUTANEOUS at 02:03

## 2019-03-24 RX ADMIN — ACETAMINOPHEN 650 MG: 650 SUPPOSITORY RECTAL at 09:03

## 2019-03-24 RX ADMIN — CEFEPIME 2 G: 2 INJECTION, POWDER, FOR SOLUTION INTRAVENOUS at 02:03

## 2019-03-24 RX ADMIN — ENOXAPARIN SODIUM 30 MG: 100 INJECTION SUBCUTANEOUS at 06:03

## 2019-03-24 RX ADMIN — DEXTROSE: 5 SOLUTION INTRAVENOUS at 06:03

## 2019-03-24 NOTE — SUBJECTIVE & OBJECTIVE
Interval History: Pt was seen and examined. No new issues, no overall change.    Review of patient's allergies indicates:   Allergen Reactions    Augmentin [amoxicillin-pot clavulanate]      Tolerates cephalosporins     Current Facility-Administered Medications   Medication Frequency    acetaminophen suppository 650 mg Q6H PRN    acetaminophen tablet 650 mg Q4H PRN    cefepime 2 g in dextrose 5% 50 mL IVPB (ready to mix system) Q24H    divalproex capsule 500 mg Daily    enoxaparin injection 30 mg Daily    influenza (FLUZONE HIGH-DOSE) vaccine 0.5 mL vaccine x 1 dose    memantine tablet 5 mg BID    metoprolol tartrate (LOPRESSOR) tablet 25 mg BID    OLANZapine tablet 5 mg QHS    pneumoc 13-konstantin conj-dip cr(PF) (PREVNAR 13 (PF)) 0.5 mL vaccine x 1 dose    pravastatin tablet 20 mg Daily    sodium bicarbonate tablet 650 mg TID    sterile water 1,000 mL with sodium chloride (23.4%) 4 mEq/mL 37.52 mEq infusion Continuous    tamsulosin 24 hr capsule 0.4 mg Daily    venlafaxine tablet 150 mg Daily       Objective:     Vital Signs (Most Recent):  Temp: 99.5 °F (37.5 °C) (03/24/19 1129)  Pulse: 69 (03/24/19 1129)  Resp: 18 (03/24/19 1129)  BP: 135/65 (03/24/19 1129)  SpO2: 99 % (03/24/19 1129)  O2 Device (Oxygen Therapy): room air (03/24/19 1129) Vital Signs (24h Range):  Temp:  [99.5 °F (37.5 °C)-100.7 °F (38.2 °C)] 99.5 °F (37.5 °C)  Pulse:  [] 69  Resp:  [17-28] 18  SpO2:  [91 %-99 %] 99 %  BP: (106-135)/(60-68) 135/65     Weight: 60 kg (132 lb 4.4 oz) (03/22/19 0356)  Body mass index is 20.72 kg/m².  Body surface area is 1.68 meters squared.    I/O last 3 completed shifts:  In: 2450 [I.V.:1050; NG/GT:1400]  Out: 3 [Stool:3]    Physical Exam   Constitutional: He is oriented to person, place, and time. He appears well-developed.   Looks thin and malnourished   HENT:   Head: Normocephalic and atraumatic.   Neck: No JVD present.   Cardiovascular: Normal rate, regular rhythm and normal heart sounds. Exam  reveals no friction rub.   Pulmonary/Chest: Effort normal and breath sounds normal. No respiratory distress. He has no rales.   Abdominal: Soft. Bowel sounds are normal. He exhibits no distension. There is no guarding.   Musculoskeletal: He exhibits no edema.   Neurological: He is alert and oriented to person, place, and time.   Skin: Skin is warm and dry.   Psychiatric: He has a normal mood and affect. His behavior is normal.   Nursing note and vitals reviewed.      Significant Labs: reviewed  BMP  Lab Results   Component Value Date     (H) 03/24/2019    K 3.6 03/24/2019     (HH) 03/24/2019    CO2 14 (L) 03/24/2019    BUN 77 (H) 03/24/2019    CREATININE 2.2 (H) 03/24/2019    CALCIUM 8.3 (L) 03/24/2019    ANIONGAP 12 03/24/2019    ESTGFRAFRICA 31 (A) 03/24/2019    EGFRNONAA 27 (A) 03/24/2019

## 2019-03-24 NOTE — PROGRESS NOTES
[facility] Hospital Medicine  Progress Note    Patient Name: David Montero  MRN: [id number]  Patient Class: IP- Inpatient   Admission Date: 3/20/2019  Length of Stay: 4 days  Attending Physician: Rosemary Thompson MD  Primary Care Provider: Steve Weeks MD        Subjective:     Principal Problem:Hypernatremia    HPI:  Patient has altered mental status -history from electronic chart .  a 83 y.o. male patient with history of CAD, AAA, Alzheimer's Dementia, chronic UTI, HTN, presents to the Emergency Department for AMS.  He is a resident of   [facility].     He had recent serum sodium done at [facility]  3/6/2019 3/5/2019 3/4/2019 3/3/2019 3/2/2019 3/1/2019 3/1/2019 2/28/2019 2/28/2019 2/27/2019 2/27/2019 2/27/2019 2/26/2019 12/18/2018 12/17/2018 12/17/2018 12/16/2018 12/16/2018 12/15/2018 12/15/2018 12/14/2018 12/14/2018 12/13/2018 12/13/2018 12/12/2018 11/6/2018 9/3/2018 6/4/2018 3/2/2018 3/1/2018 2/28/2018 2/27/2018 2/26/2018 2/12/2018 12/20/2017 12/15/2017 12/1/2017 9/1/2017 6/1/2017 3/7/2017 5/15/2015 5/14/2015 5/13/2015     140 140 137 140 139     Since admission , serum sodium is 175. He is non verbal   Head CT scan -   There is no evidence of an acute ischemic event.  Previous records from NH- he has no family and is full code.      Hospital Course:  Pt is a nursing home resident with Alzheimer's Dementia, bedbound with contractures. Admitted as his sodium level = 175 and chloride > 130. Pt was nonverbal and according to nursing home needs to be an DNR however, there is no family available. Nephrology assisting with care. IV fluids consisted of sterile water with low sodium chloride content. A NGT was placed and free water flushes were prescribed every 6 hours. BMP ordered every 12 hours. Pt was designated an DNR by Marcos Sidhu and Wolgfang. Empiric cefepime given for leukocytosis with no clear source.     3/22- remains weak and non verbal, serum sodium is now 162.ABG showed PH -7.49 with Pco2- 23,  Co2- 11    3/23- he remains non verbal, NG tube was repositioned , serum sodium is now 159    3/24- frail elderly man, cachectic, non verbal, bed bound, getting IVF- sterile water with NaCl-- Na and Cl slowly improving, no caloric intake, will change IVF to D5W and consider starting TF from tomorrow. Pt appears hospice appropriate.     Interval History: remains non verbal, bed bound-- getting IVF, IV Cefepime-- Na is now 152, Cl 128-- change to D%W.    Review of Systems   Unable to perform ROS: Patient nonverbal     Objective:     Vital Signs (Most Recent):  Temp: 99.5 °F (37.5 °C) (03/24/19 1129)  Pulse: 95 (03/24/19 1619)  Resp: 18 (03/24/19 1619)  BP: 137/75 (03/24/19 1619)  SpO2: 96 % (03/24/19 1619) Vital Signs (24h Range):  Temp:  [99.5 °F (37.5 °C)-100.7 °F (38.2 °C)] 99.5 °F (37.5 °C)  Pulse:  [] 95  Resp:  [17-28] 18  SpO2:  [91 %-99 %] 96 %  BP: (106-137)/(60-75) 137/75     Weight: 60 kg (132 lb 4.4 oz)  Body mass index is 20.72 kg/m².    Intake/Output Summary (Last 24 hours) at 3/24/2019 1759  Last data filed at 3/24/2019 0600  Gross per 24 hour   Intake 900 ml   Output 3 ml   Net 897 ml      Physical Exam   Constitutional:   Cachetic, non verbal   HENT:   Mouth/Throat: Mucous membranes are dry.   Dry, cracked tongue and oral MM   Eyes: EOM are normal. Pupils are unequal.   Left pupil irregular   Neck: Normal range of motion. Neck supple.   Cardiovascular: Normal rate and regular rhythm.   Pulmonary/Chest: Effort normal and breath sounds normal.   Abdominal: Soft. Bowel sounds are normal.   NGT placed   Musculoskeletal: He exhibits no edema.   Contracted    Neurological:   No verbal    Skin: Skin is warm and dry.   Psychiatric:   Non verbal    Nursing note and vitals reviewed.      Significant Labs:   BMP:   Recent Labs   Lab 03/24/19  0444   GLU 80   *   K 3.6   *   CO2 14*   BUN 77*   CREATININE 2.2*   CALCIUM 8.3*     CBC:   Recent Labs   Lab 03/23/19  0513 03/24/19  0444   WBC 14.90*  13.42*   HGB 13.0* 11.8*   HCT 43.6 38.6*    144*     All pertinent labs within the past 24 hours have been reviewed.    Significant Imaging: I have reviewed all pertinent imaging results/findings within the past 24 hours.    Assessment/Plan:      * Hypernatremia    Will use D5 water ,  Nephrology consult .  Will correct water deficit .  Will need close monitoring to over over rapid correction.  Serum sodium was normal on 03.06  3/22 - no improvement - IV fluids changed to sterile water  Also, NGT placed for free water flushes every 6 hours  BMP every 6 hours  3/22- will continue correction of serum sodium, nephrology on board.  On serile water with 23% NACL , serum sodium is 162, continue water flushes  03/23 -serum sodium is now 159 , will continue close monitoring of serum sodium and renal function   3/24- Na improved to 154- will switch to D5W    Encephalopathy, metabolic    3/22- related to hypernatremia and baseline dementia-will continue correction of hypernatremia  3/23- will continue supportive care and close monitoring of serum sodium  3/24-- remains the same despite rehydration and improvement of her Hypernatremia/ Dehydration.    Altered mental status  Remains the same      Leucocytosis    Will follow cultures to guide therapy , on cefepime  Blood cultures - NGTD  Urine culture results pending  Pt has been afebrile      ARIADNA (acute kidney injury)    Likely pre renal, will continue hydration , needs close monitoring of serum creatine  3/21 - no significant improvement     3/23- serum creatinine remains stable at 2.1  3/24- Bun/Cr slowly improving to 77/2.2-- will ask Nursing to give free water via NGT    Dementia    Supportive, needs to be DNR, has no family , will plan to do physician directed DNR   3/21- Physician directed DNR by Marcos Sidhu and Wolfgang  3/22- will continue supportive care    3/24- pt is severely demented, cachectic, bed bound-- appears hospice appropriate  Await further d/w  family    Bladder cancer    Continue supportive care ,out patient follow up       VTE Risk Mitigation (From admission, onward)        Ordered     enoxaparin injection 30 mg  Daily      03/22/19 6791              Rosemary Thompson MD  Department of Hospital Medicine   [facility]

## 2019-03-24 NOTE — ASSESSMENT & PLAN NOTE
82 y/o male with change in MS and hypernatremia:           * Acute hypernatremia     Severe hypernatremia due to dehydration  Improved (162 to 159) since yesterday after adding water by OG tube  Mgmt reviewed  Will increase rate of IVF's to 100 ml/hr  Will increase water by NG tube at from 300 to 450 ml q 6 hours.         ARIADNA (acute kidney injury)     ARIADNA. Due to dehydration.  Baseline s Cr not known.  s Cr lower today, ARIADNA improving   May have CKD. Stage not known. Repeat labs will clarify  K normal ro borderline low  Noted metabolic acidosis, signifies likely CKD  Will add Na bicarbonate by OG tube      Dementia     Baseline dementia  Baseline MS not known  Presented with change in mental status, likely due to severe hypernatremia         Plans and recommendations:   As detailed above

## 2019-03-24 NOTE — PLAN OF CARE
Problem: Adult Inpatient Plan of Care  Goal: Plan of Care Review  Outcome: Ongoing (interventions implemented as appropriate)  Patient resting peacefully at this time no signs of pain noted. Patient continues to be nonverbal with NG tube intact to left nare on continuous low intermittent suction. Patient turn Q2H during shift. IV remains intact with D5W infusing 100cc/hr. Tele monitor NSR during shift. Patient continues to have shallow breathing with occasional episodes of tachypnea. Will continue to monitor patient.

## 2019-03-24 NOTE — ASSESSMENT & PLAN NOTE
Likely pre renal, will continue hydration , needs close monitoring of serum creatine  3/21 - no significant improvement     3/23- serum creatinine remains stable at 2.1  3/24- Bun/Cr slowly improving to 77/2.2-- will ask Nursing to give free water via NGT

## 2019-03-24 NOTE — SUBJECTIVE & OBJECTIVE
Interval History: remains non verbal, bed bound-- getting IVF, IV Cefepime-- Na is now 152, Cl 128-- change to D%W.    Review of Systems   Unable to perform ROS: Patient nonverbal     Objective:     Vital Signs (Most Recent):  Temp: 99.5 °F (37.5 °C) (03/24/19 1129)  Pulse: 95 (03/24/19 1619)  Resp: 18 (03/24/19 1619)  BP: 137/75 (03/24/19 1619)  SpO2: 96 % (03/24/19 1619) Vital Signs (24h Range):  Temp:  [99.5 °F (37.5 °C)-100.7 °F (38.2 °C)] 99.5 °F (37.5 °C)  Pulse:  [] 95  Resp:  [17-28] 18  SpO2:  [91 %-99 %] 96 %  BP: (106-137)/(60-75) 137/75     Weight: 60 kg (132 lb 4.4 oz)  Body mass index is 20.72 kg/m².    Intake/Output Summary (Last 24 hours) at 3/24/2019 1759  Last data filed at 3/24/2019 0600  Gross per 24 hour   Intake 900 ml   Output 3 ml   Net 897 ml      Physical Exam   Constitutional:   Cachetic, non verbal   HENT:   Mouth/Throat: Mucous membranes are dry.   Dry, cracked tongue and oral MM   Eyes: EOM are normal. Pupils are unequal.   Left pupil irregular   Neck: Normal range of motion. Neck supple.   Cardiovascular: Normal rate and regular rhythm.   Pulmonary/Chest: Effort normal and breath sounds normal.   Abdominal: Soft. Bowel sounds are normal.   NGT placed   Musculoskeletal: He exhibits no edema.   Contracted    Neurological:   No verbal    Skin: Skin is warm and dry.   Psychiatric:   Non verbal    Nursing note and vitals reviewed.      Significant Labs:   BMP:   Recent Labs   Lab 03/24/19  0444   GLU 80   *   K 3.6   *   CO2 14*   BUN 77*   CREATININE 2.2*   CALCIUM 8.3*     CBC:   Recent Labs   Lab 03/23/19  0513 03/24/19  0444   WBC 14.90* 13.42*   HGB 13.0* 11.8*   HCT 43.6 38.6*    144*     All pertinent labs within the past 24 hours have been reviewed.    Significant Imaging: I have reviewed all pertinent imaging results/findings within the past 24 hours.

## 2019-03-24 NOTE — ASSESSMENT & PLAN NOTE
3/22- related to hypernatremia and baseline dementia-will continue correction of hypernatremia  3/23- will continue supportive care and close monitoring of serum sodium  3/24-- remains the same despite rehydration and improvement of her Hypernatremia/ Dehydration.

## 2019-03-24 NOTE — PROGRESS NOTES
"[facility] -   Nephrology  Progress Note    Patient Name: David Montero  MRN: [id number]  Admission Date: 3/20/2019  Hospital Length of Stay: 4 days  Attending Provider: Rosemary Thompson MD   Primary Care Physician: Steve Weeks MD  Principal Problem:Acute hypernatremia    Subjective:     HPI: 83 year old male with dementia, CAD, h/o bladder cancer, HTN, hyperlipidemia, BPH, femoral artery aneurysm, AAA, GERD, s/p CVA, chronic UTI [place] [facility] [place] "altered mental status". Patient was transferred from [facility]. Work-up revealed hypernatremia (Na 172) and ARIADNA (creatinine has increased from 1.1 on 3/6/19 to 3.1 on 3/20/19). Also with leucocytosis (WBC 14.2). He received 2 liters of IV fluids in ER.   Nephrology was consulted to help with patient's renal and electrolyte care. Patient was seen and examined in his hospital room. Patient is demented and currently nonverbal. He is not able to provide any additional history.   Chart review revealed that patient is seen at [facility]. Labs from 3/6/19 revealed Na of 140, K-5.3, CO2 of 18, creatinine of 1.1, Calcium of 9.2, WBC of 6.1, Hgb of 14.3, platelets of 262. Urinalysis from 2/26/19 showed 100 protein, > 100 WBC, 5-10 RBC, + LE. ECHO from 3/2/19 revealed EF of 48%.     Interval History: Pt was seen and examined. No new issues, no overall change.    Review of patient's allergies indicates:   Allergen Reactions    Augmentin [amoxicillin-pot clavulanate]      Tolerates cephalosporins     Current Facility-Administered Medications   Medication Frequency    acetaminophen suppository 650 mg Q6H PRN    acetaminophen tablet 650 mg Q4H PRN    cefepime 2 g in dextrose 5% 50 mL IVPB (ready to mix system) Q24H    divalproex capsule 500 mg Daily    enoxaparin injection 30 mg Daily    influenza (FLUZONE HIGH-DOSE) vaccine 0.5 mL vaccine x 1 dose    memantine tablet 5 mg BID    metoprolol tartrate (LOPRESSOR) tablet 25 mg BID    OLANZapine tablet 5 mg QHS "    pneumoc 13-konstantin conj-dip cr(PF) (PREVNAR 13 (PF)) 0.5 mL vaccine x 1 dose    pravastatin tablet 20 mg Daily    sodium bicarbonate tablet 650 mg TID    sterile water 1,000 mL with sodium chloride (23.4%) 4 mEq/mL 37.52 mEq infusion Continuous    tamsulosin 24 hr capsule 0.4 mg Daily    venlafaxine tablet 150 mg Daily       Objective:     Vital Signs (Most Recent):  Temp: 99.5 °F (37.5 °C) (03/24/19 1129)  Pulse: 69 (03/24/19 1129)  Resp: 18 (03/24/19 1129)  BP: 135/65 (03/24/19 1129)  SpO2: 99 % (03/24/19 1129)  O2 Device (Oxygen Therapy): room air (03/24/19 1129) Vital Signs (24h Range):  Temp:  [99.5 °F (37.5 °C)-100.7 °F (38.2 °C)] 99.5 °F (37.5 °C)  Pulse:  [] 69  Resp:  [17-28] 18  SpO2:  [91 %-99 %] 99 %  BP: (106-135)/(60-68) 135/65     Weight: 60 kg (132 lb 4.4 oz) (03/22/19 0356)  Body mass index is 20.72 kg/m².  Body surface area is 1.68 meters squared.    I/O last 3 completed shifts:  In: 2450 [I.V.:1050; NG/GT:1400]  Out: 3 [Stool:3]    Physical Exam   Constitutional: He is oriented to person, place, and time. He appears well-developed.   Looks thin and malnourished   HENT:   Head: Normocephalic and atraumatic.   Neck: No JVD present.   Cardiovascular: Normal rate, regular rhythm and normal heart sounds. Exam reveals no friction rub.   Pulmonary/Chest: Effort normal and breath sounds normal. No respiratory distress. He has no rales.   Abdominal: Soft. Bowel sounds are normal. He exhibits no distension. There is no guarding.   Musculoskeletal: He exhibits no edema.   Neurological: He is alert and oriented to person, place, and time.   Skin: Skin is warm and dry.   Psychiatric: He has a normal mood and affect. His behavior is normal.   Nursing note and vitals reviewed.      Significant Labs: reviewed  BMP  Lab Results   Component Value Date     (H) 03/24/2019    K 3.6 03/24/2019     (HH) 03/24/2019    CO2 14 (L) 03/24/2019    BUN 77 (H) 03/24/2019    CREATININE 2.2 (H) 03/24/2019     CALCIUM 8.3 (L) 03/24/2019    ANIONGAP 12 03/24/2019    ESTGFRAFRICA 31 (A) 03/24/2019    EGFRNONAA 27 (A) 03/24/2019         Assessment/Plan:     84 y/o male with change in MS and hypernatremia:           * Acute hypernatremia     Severe hypernatremia due to dehydration  Improved further (162 to 154) with current treatment.  Mgmt reviewed  Continue rate of IVF's to 100 ml/hr  Continue water by NG tube at 450 ml q 6 hours.         ARIADNA (acute kidney injury)     ARIADNA. Due to dehydration.  Baseline s Cr not known.  s Cr lower stable, no further change today  ARIADNA overall has improved  May have CKD. Stage not known. Repeat labs will clarify  K normal   Metabolic acidosis, signifies likely CKD  Added Na bicarbonate by OG tube yesterday, will monitor      Dementia     Baseline dementia  Baseline MS not known  Presented with change in mental status, likely due to severe hypernatremia         Plans and recommendations:   As detailed above      Rommel Tellez MD  Nephrology  [facility]

## 2019-03-24 NOTE — NURSING
Called into the patients room by PCT. PCT reported that he had white foam coming out of his mouth and nose. Patient was repositioned and head elevated at 45 degrees. Notified DEDE Blackwell. NG tube hooked up to low/medium suction. Xray of abdomen ordered. Will continue to monitor.

## 2019-03-24 NOTE — ASSESSMENT & PLAN NOTE
Supportive, needs to be DNR, has no family , will plan to do physician directed DNR   3/21- Physician directed DNR by Marcos Sidhu and Wolfgang  3/22- will continue supportive care    3/24- pt is severely demented, cachectic, bed bound-- appears hospice appropriate  Await further d/w family

## 2019-03-24 NOTE — ASSESSMENT & PLAN NOTE
Will use D5 water ,  Nephrology consult .  Will correct water deficit .  Will need close monitoring to over over rapid correction.  Serum sodium was normal on 03.06  3/22 - no improvement - IV fluids changed to sterile water  Also, NGT placed for free water flushes every 6 hours  BMP every 6 hours  3/22- will continue correction of serum sodium, nephrology on board.  On serile water with 23% NACL , serum sodium is 162, continue water flushes  03/23 -serum sodium is now 159 , will continue close monitoring of serum sodium and renal function   3/24- Na improved to 154- will switch to D5W

## 2019-03-24 NOTE — PLAN OF CARE
Problem: Adult Inpatient Plan of Care  Goal: Plan of Care Review  Outcome: Ongoing (interventions implemented as appropriate)  Patient AAOX3. No c/o pain at this time. Patient NSR during shift on Tele monitor. Patient's vitals wnl during shift. NG tube remains intact to left nare connected to continuous suction.  Will continue to monitor patient

## 2019-03-25 LAB
ALBUMIN SERPL BCP-MCNC: 2.2 G/DL (ref 3.5–5.2)
ANION GAP SERPL CALC-SCNC: 10 MMOL/L (ref 8–16)
ANION GAP SERPL CALC-SCNC: 11 MMOL/L (ref 8–16)
BACTERIA BLD CULT: NORMAL
BACTERIA BLD CULT: NORMAL
BASOPHILS # BLD AUTO: 0.02 K/UL (ref 0–0.2)
BASOPHILS NFR BLD: 0.2 % (ref 0–1.9)
BUN SERPL-MCNC: 59 MG/DL (ref 8–23)
BUN SERPL-MCNC: 65 MG/DL (ref 8–23)
CALCIUM SERPL-MCNC: 8.2 MG/DL (ref 8.7–10.5)
CALCIUM SERPL-MCNC: 8.3 MG/DL (ref 8.7–10.5)
CHLORIDE SERPL-SCNC: 120 MMOL/L (ref 95–110)
CHLORIDE SERPL-SCNC: 121 MMOL/L (ref 95–110)
CO2 SERPL-SCNC: 19 MMOL/L (ref 23–29)
CO2 SERPL-SCNC: 19 MMOL/L (ref 23–29)
CREAT SERPL-MCNC: 1.8 MG/DL (ref 0.5–1.4)
CREAT SERPL-MCNC: 2 MG/DL (ref 0.5–1.4)
DIFFERENTIAL METHOD: ABNORMAL
EOSINOPHIL # BLD AUTO: 0.4 K/UL (ref 0–0.5)
EOSINOPHIL NFR BLD: 3.6 % (ref 0–8)
ERYTHROCYTE [DISTWIDTH] IN BLOOD BY AUTOMATED COUNT: 16.4 % (ref 11.5–14.5)
EST. GFR  (AFRICAN AMERICAN): 35 ML/MIN/1.73 M^2
EST. GFR  (AFRICAN AMERICAN): 40 ML/MIN/1.73 M^2
EST. GFR  (NON AFRICAN AMERICAN): 30 ML/MIN/1.73 M^2
EST. GFR  (NON AFRICAN AMERICAN): 34 ML/MIN/1.73 M^2
GLUCOSE SERPL-MCNC: 122 MG/DL (ref 70–110)
GLUCOSE SERPL-MCNC: 152 MG/DL (ref 70–110)
HCT VFR BLD AUTO: 36.7 % (ref 40–54)
HGB BLD-MCNC: 11.3 G/DL (ref 14–18)
LYMPHOCYTES # BLD AUTO: 1.7 K/UL (ref 1–4.8)
LYMPHOCYTES NFR BLD: 14 % (ref 18–48)
MAGNESIUM SERPL-MCNC: 2.3 MG/DL (ref 1.6–2.6)
MCH RBC QN AUTO: 27.4 PG (ref 27–31)
MCHC RBC AUTO-ENTMCNC: 30.8 G/DL (ref 32–36)
MCV RBC AUTO: 89 FL (ref 82–98)
MONOCYTES # BLD AUTO: 1.1 K/UL (ref 0.3–1)
MONOCYTES NFR BLD: 9 % (ref 4–15)
NEUTROPHILS # BLD AUTO: 9 K/UL (ref 1.8–7.7)
NEUTROPHILS NFR BLD: 73.4 % (ref 38–73)
PHOSPHATE SERPL-MCNC: 2.4 MG/DL (ref 2.7–4.5)
PLATELET # BLD AUTO: 134 K/UL (ref 150–350)
PMV BLD AUTO: 13 FL (ref 9.2–12.9)
POTASSIUM SERPL-SCNC: 2.8 MMOL/L (ref 3.5–5.1)
POTASSIUM SERPL-SCNC: 2.9 MMOL/L (ref 3.5–5.1)
RBC # BLD AUTO: 4.12 M/UL (ref 4.6–6.2)
SODIUM SERPL-SCNC: 150 MMOL/L (ref 136–145)
SODIUM SERPL-SCNC: 150 MMOL/L (ref 136–145)
WBC # BLD AUTO: 12.3 K/UL (ref 3.9–12.7)

## 2019-03-25 PROCEDURE — 25000003 PHARM REV CODE 250: Performed by: INTERNAL MEDICINE

## 2019-03-25 PROCEDURE — 99233 SBSQ HOSP IP/OBS HIGH 50: CPT | Mod: ,,, | Performed by: INTERNAL MEDICINE

## 2019-03-25 PROCEDURE — 63600175 PHARM REV CODE 636 W HCPCS: Performed by: EMERGENCY MEDICINE

## 2019-03-25 PROCEDURE — 85025 COMPLETE CBC W/AUTO DIFF WBC: CPT

## 2019-03-25 PROCEDURE — 63600175 PHARM REV CODE 636 W HCPCS: Performed by: INTERNAL MEDICINE

## 2019-03-25 PROCEDURE — 36415 COLL VENOUS BLD VENIPUNCTURE: CPT

## 2019-03-25 PROCEDURE — 97803 MED NUTRITION INDIV SUBSEQ: CPT

## 2019-03-25 PROCEDURE — 21400001 HC TELEMETRY ROOM

## 2019-03-25 PROCEDURE — 83735 ASSAY OF MAGNESIUM: CPT

## 2019-03-25 PROCEDURE — 25000003 PHARM REV CODE 250: Performed by: EMERGENCY MEDICINE

## 2019-03-25 PROCEDURE — 80069 RENAL FUNCTION PANEL: CPT

## 2019-03-25 PROCEDURE — 80048 BASIC METABOLIC PNL TOTAL CA: CPT

## 2019-03-25 PROCEDURE — 99233 PR SUBSEQUENT HOSPITAL CARE,LEVL III: ICD-10-PCS | Mod: ,,, | Performed by: INTERNAL MEDICINE

## 2019-03-25 RX ORDER — POTASSIUM CHLORIDE 20 MEQ/15ML
20 SOLUTION ORAL 3 TIMES DAILY
Status: DISCONTINUED | OUTPATIENT
Start: 2019-03-25 | End: 2019-03-26 | Stop reason: HOSPADM

## 2019-03-25 RX ADMIN — OLANZAPINE 5 MG: 5 TABLET, FILM COATED ORAL at 10:03

## 2019-03-25 RX ADMIN — POTASSIUM CHLORIDE 20 MEQ: 20 SOLUTION ORAL at 10:03

## 2019-03-25 RX ADMIN — POTASSIUM CHLORIDE 20 MEQ: 20 SOLUTION ORAL at 04:03

## 2019-03-25 RX ADMIN — MEMANTINE HYDROCHLORIDE 5 MG: 5 TABLET ORAL at 10:03

## 2019-03-25 RX ADMIN — TAMSULOSIN HYDROCHLORIDE 0.4 MG: 0.4 CAPSULE ORAL at 10:03

## 2019-03-25 RX ADMIN — ENOXAPARIN SODIUM 30 MG: 100 INJECTION SUBCUTANEOUS at 05:03

## 2019-03-25 RX ADMIN — POTASSIUM CHLORIDE: 2 INJECTION, SOLUTION, CONCENTRATE INTRAVENOUS at 10:03

## 2019-03-25 RX ADMIN — SODIUM BICARBONATE 650 MG TABLET 650 MG: at 10:03

## 2019-03-25 RX ADMIN — VENLAFAXINE 150 MG: 75 TABLET ORAL at 10:03

## 2019-03-25 RX ADMIN — METOPROLOL TARTRATE 25 MG: 25 TABLET ORAL at 10:03

## 2019-03-25 RX ADMIN — DIVALPROEX SODIUM 500 MG: 125 CAPSULE, COATED PELLETS ORAL at 10:03

## 2019-03-25 RX ADMIN — DEXTROSE: 5 SOLUTION INTRAVENOUS at 02:03

## 2019-03-25 RX ADMIN — CEFEPIME 2 G: 2 INJECTION, POWDER, FOR SOLUTION INTRAVENOUS at 04:03

## 2019-03-25 RX ADMIN — PRAVASTATIN SODIUM 20 MG: 20 TABLET ORAL at 10:03

## 2019-03-25 RX ADMIN — SODIUM BICARBONATE 650 MG TABLET 650 MG: at 04:03

## 2019-03-25 NOTE — PLAN OF CARE
Sw sent referral to [facility] via [facility]. Pt will d/c to [facility] with hospice.

## 2019-03-25 NOTE — PHYSICIAN QUERY
"PT Name: David Montero  MR #: [id number]    Physician Query Form - Nutrition Clarification     CDS: Sandra Pereira RN, CCDS         Contact information :ext [phone])  [email or web address]       This form is a permanent document in the medical record.     Query Date: March 25, 2019    By submitting this query, we are merely seeking further clarification of documentation.. Please utilize your independent clinical judgment when addressing the question(s) below.    The Medical record contains the following:   Indicators  Supporting Clinical Findings Location in Medical Record   x % of Estimated Energy Intake over a time frame from p.o., TF, or TPN "% Intake of Estimated Energy Needs: 0 - 25 %  % Meal Intake: NPO"   RD consult 3/21/19    Weight Status over a time frame     x Subcutaneous Fat and/or Muscle Loss "Orbital Region (Subcutaneous Fat Loss): moderate depletion  Thoracic and Lumbar Region: moderate depletion   Voodoo Region (Muscle Loss): moderate depletion  Clavicle Bone Region (Muscle Loss): moderate depletion  Clavicle and Acromion Bone Region (Muscle Loss): moderate depletion  Scapular Bone Region (Muscle Loss): moderate depletion "    "moderate subcutaneous fat and muscle loss"    "Frail" "cachectic"   RD consult 3/21/19                                Hosp med progress note 3/24/19    Fluid Accumulation or Edema      Reduced  Strength     x Wt / BMI / Usual Body Weight BMI (Calculated): 20.6   RD consult 3/21/19    Delayed Wound Healing / Failure to Thrive     x Acute or Chronic Illness "acute hypernatremia"  "Leukocytosis"  "ARIADNA"  "Dementia" H&P 3/20/19    Medication     x Treatment "Recommendation: 1. ST eval. 2. When medically able, ADAT to Cardiac with consistency per ST. 3. If unable to adv diet and TF warranted rec Novasource @ 35 ml/hr to provide 1680 kcal, 76 g protein and 602 ml free water. 4. Will continue to monitor.   Intervention: coordination of care   Goals: Meet > 85 % EEN/EPN while " "admitted"   RD consult 3/21/19    Other       AND / ASPEN Clinical Characteristics (October 2011)  A minimum of two characteristics is recommended for diagnosing either moderate or severe malnutrition   Mild Malnutrition Moderate Malnutrition Severe Malnutrition   Energy Intake from p.o., TF or TPN. < 75% intake of estimated energy needs for less than 7 days < 75% intake of estimated energy needs for greater than 7 days < 50% intake of estimated energy needs for > 5 days   Weight Loss 1-2% in 1 month  5% in 3 months  7.5% in 6 months  10% in 1 year 1-2 % in 1 week  5% in 1 month  7.5% in 3 months  10% in 6 months  20% in 1 year > 2% in 1 week  > 5% in 1 month  > 7.5% in 3 months  > 10% in 6 months  > 20% in 1 year   Physical Findings     None *Mild subcutaneous fat and/or muscle loss  *Mild fluid accumulation  *Stage II decubitus  *Surgical wound or non-healing wound *Mod/severe subcutaneous fat and/or muscle loss  *Mod/severe fluid accumulation  *Stage III or IV decubitus  *Non-healing surgical wound     Provider, please specify diagnosis or diagnoses associated with above clinical findings.    [   ] Mild Protein-Calorie Malnutrition   [xx   ] Moderate Protein-Calorie Malnutrition   [   ] Other Nutritional Diagnosis (please specify):    [   ] Other:    [  ] Clinically Undetermined       Please document in your progress notes daily for the duration of treatment until resolved and include in your discharge summary.      "

## 2019-03-25 NOTE — PHYSICIAN QUERY
"PT Name: David Montero  MR #: [id number]  Physician Query Form - Renal Condition Clarification     CDS: Sandra Pereira RN, CCDS         Contact information :ext [phone])  [email or web address]       This form is a permanent document in the medical record.     QueryDate: March 25, 2019    By submitting this query, we are merely seeking further clarification of documentation. Please utilize your independent clinical judgment when addressing the question(s) below.    The Medical record contains the following:   Indicator Supporting Clinical Findings Location in Medical Record    Kidney (Renal) Insufficiency     x Kidney (Renal) Failure / Injury "ARIADNA (acute kidney injury)" "H&P 3/20/19    Nephrotoxic Agents     x BUN/Creatinine GFR , creatinine 3.1, GFR  18  BUN  59,  creatinine 1.8, GFR  34 Lab 3/20/19  Lab 3/25/19   x Urine: Casts         Eosinophils Hyaline casts 1, granular casts 1     Dehydration      Nausea/Vomiting      Dialysis/CRRT     x Treatment: sterile water 1,000 mL with sodium chloride (23.4%) 4 mEq/mL 37.52 mEq infusion   dextrose 5 % and 0.45 % NaCl infusion    MAR 3/20/19   x Other:      Acute Kidney Injury / Acute Renal Failure has different defining criteria. A generally accepted guideline  is:   A greater than 100% (2X) rise in serum creatinine from baseline* occurring during the course of a single hospital stay.   *Baseline as determined by the providers judgment and consideration of previous lab values and other documentation, if available.  A diagnosis of Acute Kidney Injury/ Acute Renal Failure should incorporate abnormal labs and clinical findings that are clinically significant    References: 1. Eyal et al. Acute renal failure-definition, outcome measures, animal models, fluid therapy and information technology needs: the Second International Consensus Conference of the Acute Dialysis Quality Initiative (ADQI) Group. Crit Care 2004; 8:B204; 2. Fernandez et al. Acute Kidney Injury " Network: report of an initiative to improve outcomes in acute kidney injury. Crit Care 2007; 11:R31; 3. Kidney Disease: Improving Global Outcomes (KDIGO). Acute Kidney Injury Work Group. KDIGO clinical practice guidelines for acute kidney injury. Kidney Int Suppl 2012; 2:1.  The clinical guidelines noted below is only a system guideline, it does not replace the providers clinical judgment.      Doctor, please specify the diagnosis or diagnoses associated with above clinical findings.    [ xx  ] Acute Kidney Failure/Injury with Tubular Necrosis  Damage to the tubule cells of the kidney. Common triggers: shock, hypotension, IV contrast, rhabdomyolysis, medications   [   ] Other Acute Kidney Failure/Injury (please specify): ____________     [   ] Unspecified Acute Kidney Failure/Injury      [   ] Other (please specify): _________________________________   [   ]  Clinically Undetermined       Please document in your progress notes daily for the duration of treatment until resolved and include in your discharge summary.

## 2019-03-25 NOTE — PROGRESS NOTES
[facility] Hospital Medicine  Progress Note    Patient Name: David Montero  MRN: [id number]  Patient Class: IP- Inpatient   Admission Date: 3/20/2019  Length of Stay: 5 days  Attending Physician: Rosemary Thompsno MD  Primary Care Provider: Steve Weeks MD        Subjective:     Principal Problem:Acute hypernatremia    HPI:  Patient has altered mental status -history from electronic chart .  a 83 y.o. male patient with history of CAD, AAA, Alzheimer's Dementia, chronic UTI, HTN, presents to the Emergency Department for AMS.  He is a resident of   [facility].     He had recent serum sodium done at [facility]  3/6/2019 3/5/2019 3/4/2019 3/3/2019 3/2/2019 3/1/2019 3/1/2019 2/28/2019 2/28/2019 2/27/2019 2/27/2019 2/27/2019 2/26/2019 12/18/2018 12/17/2018 12/17/2018 12/16/2018 12/16/2018 12/15/2018 12/15/2018 12/14/2018 12/14/2018 12/13/2018 12/13/2018 12/12/2018 11/6/2018 9/3/2018 6/4/2018 3/2/2018 3/1/2018 2/28/2018 2/27/2018 2/26/2018 2/12/2018 12/20/2017 12/15/2017 12/1/2017 9/1/2017 6/1/2017 3/7/2017 5/15/2015 5/14/2015 5/13/2015     140 140 137 140 139     Since admission , serum sodium is 175. He is non verbal   Head CT scan -   There is no evidence of an acute ischemic event.  Previous records from NH- he has no family and is full code.      Hospital Course:  Pt is a nursing home resident with Alzheimer's Dementia, bedbound with contractures. Admitted as his sodium level = 175 and chloride > 130. Pt was nonverbal and according to nursing home needs to be an DNR however, there is no family available. Nephrology assisting with care. IV fluids consisted of sterile water with low sodium chloride content. A NGT was placed and free water flushes were prescribed every 6 hours. BMP ordered every 12 hours. Pt was designated an DNR by Marcos Sidhu and Wolfgang. Empiric cefepime given for leukocytosis with no clear source.     3/22- remains weak and non verbal, serum sodium is now 162.ABG showed PH -7.49 with Pco2-  23, Co2- 11    3/23- he remains non verbal, NG tube was repositioned , serum sodium is now 159    3/24- frail elderly man, cachectic, non verbal, bed bound, getting IVF- sterile water with NaCl-- Na and Cl slowly improving, no caloric intake, will change IVF to D5W and consider starting TF from tomorrow. Pt appears hospice appropriate.  3/25- pt remains non verbal, eyes open, no other response, getting IVF, NGT to LIS, dark biliary kind of NG aspirate, VSS, Afeb, Chest CTA B, CVS S1S2 RRR, Abd soft, BS present but hypoactive. Bun/Cr improving and Na and Cl also improving. IVF changed to D5W with KCl as k is low- 2.9. We have been trying to contact the family for further instructions about discharge/ Hospice but no answer yet. Apparently the Nurse director at the [facility] is now his sole caretaker.      D/w Ms Garza at [facility] who informed that the pt's spouse has passed away and there is no NOK, their director agrees with the hospice plan due to his severe condition and his inability to fend for himself-- hence he will accept pt back at [facility] with [facility] tomorrow but cannot accept any NGT. Will plan discharge to hospice in am.     Interval History: improving, remains non verbal, minimally responsive, getting IVF, electrolytes improving. Starting TF.     Review of Systems   Unable to perform ROS: Patient nonverbal     Objective:     Vital Signs (Most Recent):  Temp: 98.6 °F (37 °C) (03/25/19 0727)  Pulse: (!) 55 (03/25/19 1140)  Resp: 18 (03/25/19 1140)  BP: (!) 115/55 (03/25/19 1140)  SpO2: 96 % (03/25/19 1140) Vital Signs (24h Range):  Temp:  [98 °F (36.7 °C)-99 °F (37.2 °C)] 98.6 °F (37 °C)  Pulse:  [] 55  Resp:  [16-26] 18  SpO2:  [92 %-97 %] 96 %  BP: (115-142)/(55-82) 115/55     Weight: 60 kg (132 lb 4.4 oz)  Body mass index is 20.72 kg/m².    Intake/Output Summary (Last 24 hours) at 3/25/2019 1255  Last data filed at 3/25/2019 1223  Gross per 24 hour   Intake 2185 ml    Output 1 ml   Net 2184 ml      Physical Exam   Constitutional:   Cachetic, non verbal   HENT:   Mouth/Throat: Mucous membranes are dry.   Eyes: EOM are normal. Pupils are unequal.   Left pupil irregular   Neck: Normal range of motion. Neck supple.   Cardiovascular: Normal rate and regular rhythm.   Pulmonary/Chest: Effort normal and breath sounds normal.   Abdominal: Soft. Bowel sounds are normal.   NGT placed   Musculoskeletal: He exhibits no edema.   Contracted    Neurological:   No verbal    Skin: Skin is warm and dry.   Psychiatric:   Non verbal    Nursing note and vitals reviewed.      Significant Labs:   BMP:   Recent Labs   Lab 03/25/19  0844   *   *   K 2.9*   *   CO2 19*   BUN 59*   CREATININE 1.8*   CALCIUM 8.2*   MG 2.3     CBC:   Recent Labs   Lab 03/24/19 0444 03/25/19 0451   WBC 13.42* 12.30   HGB 11.8* 11.3*   HCT 38.6* 36.7*   * 134*     CMP:   Recent Labs   Lab 03/24/19 0444 03/24/19 2018 03/25/19 0451 03/25/19  0844   * 154* 150* 150*   K 3.6 3.3* 2.8* 2.9*   * 123* 120* 121*   CO2 14* 15* 19* 19*   GLU 80 85 122* 152*   BUN 77* 74* 65* 59*   CREATININE 2.2* 2.1* 2.0* 1.8*   CALCIUM 8.3* 8.3* 8.3* 8.2*   ALBUMIN 2.3*  --  2.2*  --    ANIONGAP 12 16 11 10   EGFRNONAA 27* 28* 30* 34*     All pertinent labs within the past 24 hours have been reviewed.    Significant Imaging: I have reviewed all pertinent imaging results/findings within the past 24 hours.    Assessment/Plan:      * Acute hypernatremia    Will use D5 water ,  Nephrology consult .  Will correct water deficit .  Will need close monitoring to over over rapid correction.  Serum sodium was normal on 03.06  3/22 - no improvement - IV fluids changed to sterile water  Also, NGT placed for free water flushes every 6 hours  BMP every 6 hours  3/22- will continue correction of serum sodium, nephrology on board.  On serile water with 23% NACL , serum sodium is 162, continue water flushes  03/23 -serum  sodium is now 159 , will continue close monitoring of serum sodium and renal function   3/24- Na improved to 154- will switch to D5W  3/25- improving, Increase Free water via NGT     Encephalopathy, metabolic    3/22- related to hypernatremia and baseline dementia-will continue correction of hypernatremia  3/23- will continue supportive care and close monitoring of serum sodium  3/24-- remains the same despite rehydration and improvement of her Hypernatremia/ Dehydration.    Altered mental status  Remains the same      Leucocytosis    Will follow cultures to guide therapy , on cefepime  Blood cultures - NGTD  Urine culture results pending  Pt has been afebrile  resolved, likely stress related    ARIADNA (acute kidney injury)    Likely pre renal, will continue hydration , needs close monitoring of serum creatine  3/21 - no significant improvement     3/23- serum creatinine remains stable at 2.1  3/24- Bun/Cr slowly improving to 77/2.2-- will ask Nursing to give free water via NGT    Dementia    Supportive, needs to be DNR, has no family , will plan to do physician directed DNR   3/21- Physician directed DNR by Marcos Sidhu and Wolfgang  3/22- will continue supportive care    3/24- pt is severely demented, cachectic, bed bound-- appears hospice appropriate  Await further d/w family/ caretaker    Bladder cancer    Continue supportive care ,out patient follow up   Await further instructions re hospice      VTE Risk Mitigation (From admission, onward)        Ordered     enoxaparin injection 30 mg  Daily      03/22/19 0174              Rosemary Thompson MD  Department of Hospital Medicine   [facility]

## 2019-03-25 NOTE — PLAN OF CARE
Problem: Adult Inpatient Plan of Care  Goal: Plan of Care Review  Outcome: Ongoing (interventions implemented as appropriate)  Pt nonverbal, unable to make needs known.  VSS.  Pt remained afebrile throughout this shift.   NG tube placement   No evidence of pain this shift.  Plan of care reviewed. Patient verbalizes understanding.   Pt moving/turing provided by staff. Frequent weight shifting provided.  Patient not on tele monitor.   Bed low, side rails up x 2, wheels locked, call light in reach.   Patient instructed to call for assistance.   Supplemental feedings started today, Pt tolerated well.  Hourly rounding completed.   Will continue to monitor.

## 2019-03-25 NOTE — PLAN OF CARE
Problem: Adult Inpatient Plan of Care  Goal: Plan of Care Review  Outcome: Ongoing (interventions implemented as appropriate)  Recommendations     Recommendation:  1. If able to utilize GI system, consider ST eval & ADAT to cardiac w/ consistency per ST. 2. If unable to adv diet and TF warranted rec Isosource 1.5 @ 45 ml/hr to provide 1620 kcal, 73 g protein and 825 ml free water.   3. If unable to utilize GI tract rec custom TPN: 5/20 @ 60ml/hr +IVFE Daily w/ electrolyte management per pharmacy and MD (1767 calories, 72 g protein, 3.3 mg/kg/min dextrose infusion rate).   4. Will continue to monitor.   Intervention: coordination of care   Goals: Meet > 85 % EEN/EPN while admitted  Nutrition Goal Status: new  Communication of RD Recs: (POc, sticky note, reviewed with RN)

## 2019-03-25 NOTE — PROGRESS NOTES
"[facility] -   Nephrology  Progress Note    Patient Name: David Montero  MRN: [id number]  Admission Date: 3/20/2019  Hospital Length of Stay: 5 days  Attending Provider: Rosemary Thompson MD   Primary Care Physician: Steve Weeks MD  Principal Problem:Hypernatremia    Subjective:     HPI: 83 year old male with dementia, CAD, h/o bladder cancer, HTN, hyperlipidemia, BPH, femoral artery aneurysm, AAA, GERD, s/p CVA, chronic UTI presents to [facility] with "altered mental status". Patient was transferred from [facility]. Work-up revealed hypernatremia (Na 172) and ARIADNA (creatinine has increased from 1.1 on 3/6/19 to 3.1 on 3/20/19). Also with leucocytosis (WBC 14.2). He received 2 liters of IV fluids in ER.   Nephrology was consulted to help with patient's renal and electrolyte care. Patient was seen and examined in his hospital room. Patient is demented and currently nonverbal. He is not able to provide any additional history.   Chart review revealed that patient is seen at [facility]. Labs from 3/6/19 revealed Na of 140, K-5.3, CO2 of 18, creatinine of 1.1, Calcium of 9.2, WBC of 6.1, Hgb of 14.3, platelets of 262. Urinalysis from 2/26/19 showed 100 protein, > 100 WBC, 5-10 RBC, + LE. ECHO from 3/2/19 revealed EF of 48%.     Interval History: Pt was seen and examined. No new events, no overall change    Review of patient's allergies indicates:   Allergen Reactions    Augmentin [amoxicillin-pot clavulanate]      Tolerates cephalosporins     Current Facility-Administered Medications   Medication Frequency    acetaminophen suppository 650 mg Q6H PRN    acetaminophen tablet 650 mg Q4H PRN    cefepime 2 g in dextrose 5% 50 mL IVPB (ready to mix system) Q24H    dextrose 5 % 1,000 mL with potassium chloride 40 mEq infusion Continuous    divalproex capsule 500 mg Daily    enoxaparin injection 30 mg Daily    influenza (FLUZONE HIGH-DOSE) vaccine 0.5 mL vaccine x 1 dose    memantine tablet 5 mg BID    metoprolol " tartrate (LOPRESSOR) tablet 25 mg BID    OLANZapine tablet 5 mg QHS    pneumoc 13-konstantin conj-dip cr(PF) (PREVNAR 13 (PF)) 0.5 mL vaccine x 1 dose    potassium chloride 10% oral solution 20 mEq TID    sodium bicarbonate tablet 650 mg TID    tamsulosin 24 hr capsule 0.4 mg Daily    venlafaxine tablet 150 mg Daily      D5W 1000 ml + Additives 75 mL/hr at 03/25/19 1004         Objective:     Vital Signs (Most Recent):  Temp: 98.6 °F (37 °C) (03/25/19 0727)  Pulse: (!) 55 (03/25/19 1140)  Resp: 18 (03/25/19 1140)  BP: (!) 115/55 (03/25/19 1140)  SpO2: 96 % (03/25/19 1140)  O2 Device (Oxygen Therapy): room air (03/25/19 1140) Vital Signs (24h Range):  Temp:  [98 °F (36.7 °C)-99 °F (37.2 °C)] 98.6 °F (37 °C)  Pulse:  [] 55  Resp:  [16-26] 18  SpO2:  [92 %-97 %] 96 %  BP: (115-142)/(55-82) 115/55     Weight: 60 kg (132 lb 4.4 oz) (03/22/19 0356)  Body mass index is 20.72 kg/m².  Body surface area is 1.68 meters squared.    I/O last 3 completed shifts:  In: 900 [NG/GT:900]  Out: 3 [Stool:3]    Physical Exam   Constitutional: He is oriented to person, place, and time. He appears well-developed.   Looks thin and malnourished   HENT:   Head: Normocephalic and atraumatic.   Neck: No JVD present.   Cardiovascular: Normal rate, regular rhythm and normal heart sounds. Exam reveals no friction rub.   Pulmonary/Chest: Effort normal and breath sounds normal. No respiratory distress. He has no rales.   Abdominal: Soft. Bowel sounds are normal. He exhibits no distension. There is no guarding.   Musculoskeletal: He exhibits no edema.   Neurological: He is alert and oriented to person, place, and time.   Skin: Skin is warm and dry.   Psychiatric: He has a normal mood and affect. His behavior is normal.   Nursing note and vitals reviewed.      Significant Labs: reviewed  BMP  Lab Results   Component Value Date     (H) 03/25/2019    K 2.9 (L) 03/25/2019     (H) 03/25/2019    CO2 19 (L) 03/25/2019    BUN 59 (H)  03/25/2019    CREATININE 1.8 (H) 03/25/2019    CALCIUM 8.2 (L) 03/25/2019    ANIONGAP 10 03/25/2019    ESTGFRAFRICA 40 (A) 03/25/2019    EGFRNONAA 34 (A) 03/25/2019         Assessment/Plan:       82 y/o male with change in MS and hypernatremia:           * Acute hypernatremia     Severe hypernatremia due to dehydration  s Na much improved.  OK to reduce rate if IV hypotonic solutions.  Continue enteric water  Mgmt reviewed  Decrease rate of D5W IVF's from 75 to 50 ml/hr  Continue water by NG tube at 450 ml q 6 hours.     Hyponatremia: due to the resolution phase of ARIADNA and to receiving IVF's  K being replaced     ARIADNA (acute kidney injury)     ARIADNA. Due to dehydration.  Baseline s Cr not known.  s Cr continues to improve daily  ARIADNA resolving  May have CKD. Stage not known. Repeat labs will clarify  Metabolic acidosis, signifies likely CKD  On Na bicarbonate by OG tube       Dementia     Baseline dementia  Baseline MS not known  Presented with change in mental status, likely due to severe hypernatremia         Plans and recommendations:   As detailed above               Rommel Tellez MD  Nephrology  [facility]

## 2019-03-25 NOTE — ASSESSMENT & PLAN NOTE
84 y/o male with change in MS and hypernatremia:           * Acute hypernatremia     Severe hypernatremia due to dehydration  Improved further (162 to 154) with current treatment.  Mgmt reviewed  Continue rate of IVF's to 100 ml/hr  Continue water by NG tube at 450 ml q 6 hours.         ARIADNA (acute kidney injury)     ARIADNA. Due to dehydration.  Baseline s Cr not known.  s Cr lower stable, no further change today  ARIADNA overall has improved  May have CKD. Stage not known. Repeat labs will clarify  K normal   Metabolic acidosis, signifies likely CKD  Added Na bicarbonate by OG tube yesterday, will monitor      Dementia     Baseline dementia  Baseline MS not known  Presented with change in mental status, likely due to severe hypernatremia         Plans and recommendations:   As detailed above

## 2019-03-25 NOTE — CONSULTS
[facility] -   Adult Nutrition  Progress Note    SUMMARY     Recommendations    Recommendation:  1. If able to utilize GI system, consider ST eval & ADAT to cardiac w/ consistency per ST. 2. If unable to adv diet and TF warranted rec Isosource 1.5 @ 45 ml/hr to provide 1620 kcal, 73 g protein and 825 ml free water.   3. If unable to utilize GI tract rec custom TPN: 5/20 @ 60ml/hr +IVFE Daily w/ electrolyte management per pharmacy and MD (1767 calories, 72 g protein, 3.3 mg/kg/min dextrose infusion rate).   4. Will continue to monitor.   Intervention: coordination of care   Goals: Meet > 85 % EEN/EPN while admitted  Nutrition Goal Status: new  Communication of RD Recs: (POc, sticky note, reviewed with RN)    Reason for Assessment    Reason For Assessment: RD f/u  Dx:  1. Acute hypernatremia    2. Altered mental status    3. Acute on chronic renal insufficiency    4. Chronic UTI    5. ARIADNA (acute kidney injury)    6. Altered mental status, unspecified altered mental status type    7. Vascular dementia without behavioral disturbance    8. Hypernatremia      Past Medical History:   Diagnosis Date    Alzheimer's dementia     CAD (coronary artery disease)     Cancer 2009    bladder    Hyperlipidemia     Hypertension      General info comments:  3.21.19. Pt w/ dementia, non-verbal. Currently NPO. Failed nsg dysphagia screen. No family members at bedside at time of visit. Per [facility] 3.21.19: moderate subcutaneous fat and muscle loss. Per epic records: no recent wt hx, last wt recorded in 2015.   3.25.19. No family members at time of visit.  NG tube to suction. Per MD notes, plans to start TF.   Discharge plan: pending medical course     Nutrition Risk Screen    Nutrition Risk Screen: other (see comments)    Nutrition/Diet History    Spiritual, Cultural Beliefs, Latter-day Practices, Values that Affect Care: other (see comments)(MARIANNE-nonverbal)    Anthropometrics    Temp: 98.6 °F (37 °C)  Height Method:  "Estimated  Height: 5' 7" (170.2 cm)  Height (inches): 67 in  Weight Method: Bed Scale  Weight: 60 kg (132 lb 4.4 oz)  Weight (lb): 132.28 lb  Ideal Body Weight (IBW), Male: 148 lb  % Ideal Body Weight, Male (lb): 88.64 lb  BMI (Calculated): 20.6  BMI Grade: 18.5-24.9 - normal       Lab/Procedures/Meds    Pertinent Labs Reviewed: reviewed  BMP  Lab Results   Component Value Date     (H) 03/25/2019    K 2.9 (L) 03/25/2019     (H) 03/25/2019    CO2 19 (L) 03/25/2019    BUN 59 (H) 03/25/2019    CREATININE 1.8 (H) 03/25/2019    CALCIUM 8.2 (L) 03/25/2019    ANIONGAP 10 03/25/2019    ESTGFRAFRICA 40 (A) 03/25/2019    EGFRNONAA 34 (A) 03/25/2019     Lab Results   Component Value Date    CALCIUM 8.2 (L) 03/25/2019    PHOS 2.4 (L) 03/25/2019     Lab Results   Component Value Date    ALBUMIN 2.2 (L) 03/25/2019     No results for input(s): POCTGLUCOSE in the last 24 hours.      Pertinent Medications Reviewed: reviewed    Physical Findings/Assessment     skin: Jamal score 11     Estimated/Assessed Needs    Weight Used For Calorie Calculations: 59.5 kg (131 lb 2.8 oz)  Energy Calorie Requirements (kcal): 1547- 1785   Energy Need Method: Kcal/kg  Protein Requirements: 59 - 71 g  Weight Used For Protein Calculations: 59.5 kg (131 lb 2.8 oz)     Estimated Fluid Requirement Method: RDA Method(or per MD)  RDA Method (mL): 1785         Nutrition Prescription Ordered    Nutrition Order Comments: NPO    Evaluation of Received Nutrient/Fluid Intake        No intake or output data in the 24 hours ending 03/25/19 1138    % Intake of Estimated Energy Needs: 0 - 25 %  % Meal Intake: NPO    Nutrition Risk      2xweekly    Assessment and Plan    Nutrition Problem  inadequate energy intake   Related to (etiology):   NPO status, no alternative means of nutrition    Signs and Symptoms (as evidenced by):   Meeting < 85 % EEN/EPN    Interventions/Recommendations (treatment strategy):  See above     Nutrition Diagnosis Status: "   Continues      Monitor and Evaluation    Food and Nutrient Intake: energy intake, food and beverage intake  Food and Nutrient Adminstration: diet order  Anthropometric Measurements: weight  Biochemical Data, Medical Tests and Procedures: electrolyte and renal panel, glucose/endocrine profile  Nutrition-Focused Physical Findings: overall appearance     Malnutrition Assessment                 Orbital Region (Subcutaneous Fat Loss): moderate depletion  Thoracic and Lumbar Region: moderate depletion   Anabaptist Region (Muscle Loss): moderate depletion  Clavicle Bone Region (Muscle Loss): moderate depletion  Clavicle and Acromion Bone Region (Muscle Loss): moderate depletion  Scapular Bone Region (Muscle Loss): moderate depletion                 Nutrition Follow-Up    RD Follow-up?: Yes

## 2019-03-25 NOTE — ASSESSMENT & PLAN NOTE
Will use D5 water ,  Nephrology consult .  Will correct water deficit .  Will need close monitoring to over over rapid correction.  Serum sodium was normal on 03.06  3/22 - no improvement - IV fluids changed to sterile water  Also, NGT placed for free water flushes every 6 hours  BMP every 6 hours  3/22- will continue correction of serum sodium, nephrology on board.  On serile water with 23% NACL , serum sodium is 162, continue water flushes  03/23 -serum sodium is now 159 , will continue close monitoring of serum sodium and renal function   3/24- Na improved to 154- will switch to D5W  3/25- improving, Increase Free water via NGT

## 2019-03-25 NOTE — SUBJECTIVE & OBJECTIVE
Interval History: improving, remains non verbal, minimally responsive, getting IVF, electrolytes improving. Starting TF.     Review of Systems   Unable to perform ROS: Patient nonverbal     Objective:     Vital Signs (Most Recent):  Temp: 98.6 °F (37 °C) (03/25/19 0727)  Pulse: (!) 55 (03/25/19 1140)  Resp: 18 (03/25/19 1140)  BP: (!) 115/55 (03/25/19 1140)  SpO2: 96 % (03/25/19 1140) Vital Signs (24h Range):  Temp:  [98 °F (36.7 °C)-99 °F (37.2 °C)] 98.6 °F (37 °C)  Pulse:  [] 55  Resp:  [16-26] 18  SpO2:  [92 %-97 %] 96 %  BP: (115-142)/(55-82) 115/55     Weight: 60 kg (132 lb 4.4 oz)  Body mass index is 20.72 kg/m².    Intake/Output Summary (Last 24 hours) at 3/25/2019 1255  Last data filed at 3/25/2019 1223  Gross per 24 hour   Intake 2185 ml   Output 1 ml   Net 2184 ml      Physical Exam   Constitutional:   Cachetic, non verbal   HENT:   Mouth/Throat: Mucous membranes are dry.   Eyes: EOM are normal. Pupils are unequal.   Left pupil irregular   Neck: Normal range of motion. Neck supple.   Cardiovascular: Normal rate and regular rhythm.   Pulmonary/Chest: Effort normal and breath sounds normal.   Abdominal: Soft. Bowel sounds are normal.   NGT placed   Musculoskeletal: He exhibits no edema.   Contracted    Neurological:   No verbal    Skin: Skin is warm and dry.   Psychiatric:   Non verbal    Nursing note and vitals reviewed.      Significant Labs:   BMP:   Recent Labs   Lab 03/25/19  0844   *   *   K 2.9*   *   CO2 19*   BUN 59*   CREATININE 1.8*   CALCIUM 8.2*   MG 2.3     CBC:   Recent Labs   Lab 03/24/19 0444 03/25/19  0451   WBC 13.42* 12.30   HGB 11.8* 11.3*   HCT 38.6* 36.7*   * 134*     CMP:   Recent Labs   Lab 03/24/19 0444 03/24/19 2018 03/25/19  0451 03/25/19  0844   * 154* 150* 150*   K 3.6 3.3* 2.8* 2.9*   * 123* 120* 121*   CO2 14* 15* 19* 19*   GLU 80 85 122* 152*   BUN 77* 74* 65* 59*   CREATININE 2.2* 2.1* 2.0* 1.8*   CALCIUM 8.3* 8.3* 8.3* 8.2*    ALBUMIN 2.3*  --  2.2*  --    ANIONGAP 12 16 11 10   EGFRNONAA 27* 28* 30* 34*     All pertinent labs within the past 24 hours have been reviewed.    Significant Imaging: I have reviewed all pertinent imaging results/findings within the past 24 hours.

## 2019-03-25 NOTE — ASSESSMENT & PLAN NOTE
Will follow cultures to guide therapy , on cefepime  Blood cultures - NGTD  Urine culture results pending  Pt has been afebrile  resolved, likely stress related

## 2019-03-25 NOTE — ASSESSMENT & PLAN NOTE
Supportive, needs to be DNR, has no family , will plan to do physician directed DNR   3/21- Physician directed DNR by Marcos Sidhu and Wolfgang  3/22- will continue supportive care    3/24- pt is severely demented, cachectic, bed bound-- appears hospice appropriate  Await further d/w family/ caretaker

## 2019-03-25 NOTE — SUBJECTIVE & OBJECTIVE
Interval History: Pt was seen and examined. No new events, no overall change    Review of patient's allergies indicates:   Allergen Reactions    Augmentin [amoxicillin-pot clavulanate]      Tolerates cephalosporins     Current Facility-Administered Medications   Medication Frequency    acetaminophen suppository 650 mg Q6H PRN    acetaminophen tablet 650 mg Q4H PRN    cefepime 2 g in dextrose 5% 50 mL IVPB (ready to mix system) Q24H    dextrose 5 % 1,000 mL with potassium chloride 40 mEq infusion Continuous    divalproex capsule 500 mg Daily    enoxaparin injection 30 mg Daily    influenza (FLUZONE HIGH-DOSE) vaccine 0.5 mL vaccine x 1 dose    memantine tablet 5 mg BID    metoprolol tartrate (LOPRESSOR) tablet 25 mg BID    OLANZapine tablet 5 mg QHS    pneumoc 13-konstantin conj-dip cr(PF) (PREVNAR 13 (PF)) 0.5 mL vaccine x 1 dose    potassium chloride 10% oral solution 20 mEq TID    sodium bicarbonate tablet 650 mg TID    tamsulosin 24 hr capsule 0.4 mg Daily    venlafaxine tablet 150 mg Daily       Objective:     Vital Signs (Most Recent):  Temp: 98.6 °F (37 °C) (03/25/19 0727)  Pulse: (!) 55 (03/25/19 1140)  Resp: 18 (03/25/19 1140)  BP: (!) 115/55 (03/25/19 1140)  SpO2: 96 % (03/25/19 1140)  O2 Device (Oxygen Therapy): room air (03/25/19 1140) Vital Signs (24h Range):  Temp:  [98 °F (36.7 °C)-99 °F (37.2 °C)] 98.6 °F (37 °C)  Pulse:  [] 55  Resp:  [16-26] 18  SpO2:  [92 %-97 %] 96 %  BP: (115-142)/(55-82) 115/55     Weight: 60 kg (132 lb 4.4 oz) (03/22/19 0356)  Body mass index is 20.72 kg/m².  Body surface area is 1.68 meters squared.    I/O last 3 completed shifts:  In: 900 [NG/GT:900]  Out: 3 [Stool:3]    Physical Exam   Constitutional: He is oriented to person, place, and time. He appears well-developed.   Looks thin and malnourished   HENT:   Head: Normocephalic and atraumatic.   Neck: No JVD present.   Cardiovascular: Normal rate, regular rhythm and normal heart sounds. Exam reveals no  friction rub.   Pulmonary/Chest: Effort normal and breath sounds normal. No respiratory distress. He has no rales.   Abdominal: Soft. Bowel sounds are normal. He exhibits no distension. There is no guarding.   Musculoskeletal: He exhibits no edema.   Neurological: He is alert and oriented to person, place, and time.   Skin: Skin is warm and dry.   Psychiatric: He has a normal mood and affect. His behavior is normal.   Nursing note and vitals reviewed.      Significant Labs: reviewed  BMP  Lab Results   Component Value Date     (H) 03/25/2019    K 2.9 (L) 03/25/2019     (H) 03/25/2019    CO2 19 (L) 03/25/2019    BUN 59 (H) 03/25/2019    CREATININE 1.8 (H) 03/25/2019    CALCIUM 8.2 (L) 03/25/2019    ANIONGAP 10 03/25/2019    ESTGFRAFRICA 40 (A) 03/25/2019    EGFRNONAA 34 (A) 03/25/2019

## 2019-03-26 VITALS
BODY MASS INDEX: 20.76 KG/M2 | WEIGHT: 132.25 LBS | DIASTOLIC BLOOD PRESSURE: 56 MMHG | HEIGHT: 67 IN | TEMPERATURE: 99 F | HEART RATE: 95 BPM | RESPIRATION RATE: 20 BRPM | OXYGEN SATURATION: 94 % | SYSTOLIC BLOOD PRESSURE: 113 MMHG

## 2019-03-26 PROBLEM — N17.9 AKI (ACUTE KIDNEY INJURY): Status: RESOLVED | Noted: 2019-03-20 | Resolved: 2019-03-26

## 2019-03-26 PROBLEM — E87.0 ACUTE HYPERNATREMIA: Status: RESOLVED | Noted: 2019-03-20 | Resolved: 2019-03-26

## 2019-03-26 PROBLEM — D72.829 LEUCOCYTOSIS: Status: RESOLVED | Noted: 2019-03-20 | Resolved: 2019-03-26

## 2019-03-26 PROBLEM — G93.41 ENCEPHALOPATHY, METABOLIC: Status: RESOLVED | Noted: 2019-03-23 | Resolved: 2019-03-26

## 2019-03-26 LAB
ALBUMIN SERPL BCP-MCNC: 2 G/DL (ref 3.5–5.2)
ANION GAP SERPL CALC-SCNC: 9 MMOL/L (ref 8–16)
BASOPHILS # BLD AUTO: 0.01 K/UL (ref 0–0.2)
BASOPHILS NFR BLD: 0.1 % (ref 0–1.9)
BUN SERPL-MCNC: 52 MG/DL (ref 8–23)
CALCIUM SERPL-MCNC: 8.3 MG/DL (ref 8.7–10.5)
CHLORIDE SERPL-SCNC: 119 MMOL/L (ref 95–110)
CO2 SERPL-SCNC: 22 MMOL/L (ref 23–29)
CREAT SERPL-MCNC: 1.7 MG/DL (ref 0.5–1.4)
DIFFERENTIAL METHOD: ABNORMAL
EOSINOPHIL # BLD AUTO: 0.4 K/UL (ref 0–0.5)
EOSINOPHIL NFR BLD: 3.5 % (ref 0–8)
ERYTHROCYTE [DISTWIDTH] IN BLOOD BY AUTOMATED COUNT: 16.1 % (ref 11.5–14.5)
EST. GFR  (AFRICAN AMERICAN): 42 ML/MIN/1.73 M^2
EST. GFR  (NON AFRICAN AMERICAN): 37 ML/MIN/1.73 M^2
GLUCOSE SERPL-MCNC: 107 MG/DL (ref 70–110)
HCT VFR BLD AUTO: 36.1 % (ref 40–54)
HGB BLD-MCNC: 11.2 G/DL (ref 14–18)
LYMPHOCYTES # BLD AUTO: 1.6 K/UL (ref 1–4.8)
LYMPHOCYTES NFR BLD: 15.1 % (ref 18–48)
MCH RBC QN AUTO: 27.5 PG (ref 27–31)
MCHC RBC AUTO-ENTMCNC: 31 G/DL (ref 32–36)
MCV RBC AUTO: 89 FL (ref 82–98)
MONOCYTES # BLD AUTO: 1.1 K/UL (ref 0.3–1)
MONOCYTES NFR BLD: 10.7 % (ref 4–15)
NEUTROPHILS # BLD AUTO: 7.4 K/UL (ref 1.8–7.7)
NEUTROPHILS NFR BLD: 71.2 % (ref 38–73)
OVALOCYTES BLD QL SMEAR: ABNORMAL
PHOSPHATE SERPL-MCNC: 1.6 MG/DL (ref 2.7–4.5)
PLATELET # BLD AUTO: 139 K/UL (ref 150–350)
PMV BLD AUTO: 12.7 FL (ref 9.2–12.9)
POIKILOCYTOSIS BLD QL SMEAR: SLIGHT
POTASSIUM SERPL-SCNC: 3.9 MMOL/L (ref 3.5–5.1)
RBC # BLD AUTO: 4.08 M/UL (ref 4.6–6.2)
SODIUM SERPL-SCNC: 150 MMOL/L (ref 136–145)
WBC # BLD AUTO: 10.42 K/UL (ref 3.9–12.7)

## 2019-03-26 PROCEDURE — 80069 RENAL FUNCTION PANEL: CPT

## 2019-03-26 PROCEDURE — 99233 PR SUBSEQUENT HOSPITAL CARE,LEVL III: ICD-10-PCS | Mod: ,,, | Performed by: INTERNAL MEDICINE

## 2019-03-26 PROCEDURE — 85025 COMPLETE CBC W/AUTO DIFF WBC: CPT

## 2019-03-26 PROCEDURE — 25000003 PHARM REV CODE 250: Performed by: EMERGENCY MEDICINE

## 2019-03-26 PROCEDURE — 25000003 PHARM REV CODE 250: Performed by: INTERNAL MEDICINE

## 2019-03-26 PROCEDURE — 36415 COLL VENOUS BLD VENIPUNCTURE: CPT

## 2019-03-26 PROCEDURE — 63600175 PHARM REV CODE 636 W HCPCS: Performed by: INTERNAL MEDICINE

## 2019-03-26 PROCEDURE — 99233 SBSQ HOSP IP/OBS HIGH 50: CPT | Mod: ,,, | Performed by: INTERNAL MEDICINE

## 2019-03-26 RX ORDER — SODIUM,POTASSIUM PHOSPHATES 280-250MG
2 POWDER IN PACKET (EA) ORAL
Status: DISCONTINUED | OUTPATIENT
Start: 2019-03-26 | End: 2019-03-26 | Stop reason: HOSPADM

## 2019-03-26 RX ADMIN — POTASSIUM CHLORIDE 20 MEQ: 20 SOLUTION ORAL at 09:03

## 2019-03-26 RX ADMIN — DIVALPROEX SODIUM 500 MG: 125 CAPSULE, COATED PELLETS ORAL at 09:03

## 2019-03-26 RX ADMIN — POTASSIUM & SODIUM PHOSPHATES POWDER PACK 280-160-250 MG 2 PACKET: 280-160-250 PACK at 09:03

## 2019-03-26 RX ADMIN — TAMSULOSIN HYDROCHLORIDE 0.4 MG: 0.4 CAPSULE ORAL at 09:03

## 2019-03-26 RX ADMIN — SODIUM BICARBONATE 650 MG TABLET 650 MG: at 09:03

## 2019-03-26 RX ADMIN — METOPROLOL TARTRATE 25 MG: 25 TABLET ORAL at 09:03

## 2019-03-26 RX ADMIN — MEMANTINE HYDROCHLORIDE 5 MG: 5 TABLET ORAL at 09:03

## 2019-03-26 RX ADMIN — VENLAFAXINE 150 MG: 75 TABLET ORAL at 09:03

## 2019-03-26 RX ADMIN — POTASSIUM CHLORIDE: 2 INJECTION, SOLUTION, CONCENTRATE INTRAVENOUS at 08:03

## 2019-03-26 NOTE — DISCHARGE SUMMARY
[facility] Hospital Medicine  Discharge Summary      Patient Name: David Montero  MRN: [id number]  Admission Date: 3/20/2019  Hospital Length of Stay: 6 days  Discharge Date and Time:  03/26/2019 10:49 AM  Attending Physician: Rosemary Thompson MD   Discharging Provider: Rosemary Thompson MD  Primary Care Provider: Steve Weeks MD      HPI:   Patient has altered mental status -history from electronic chart .  a 83 y.o. male patient with history of CAD, AAA, Alzheimer's Dementia, chronic UTI, HTN, presents to the Emergency Department for AMS.  He is a resident of   [facility], [place], operated by [facility].     He had recent serum sodium done at [facility]-  3/6/2019 3/5/2019 3/4/2019 3/3/2019 3/2/2019 3/1/2019 3/1/2019 2/28/2019 2/28/2019 2/27/2019 2/27/2019 2/27/2019 2/26/2019 12/18/2018 12/17/2018 12/17/2018 12/16/2018 12/16/2018 12/15/2018 12/15/2018 12/14/2018 12/14/2018 12/13/2018 12/13/2018 12/12/2018 11/6/2018 9/3/2018 6/4/2018 3/2/2018 3/1/2018 2/28/2018 2/27/2018 2/26/2018 2/12/2018 12/20/2017 12/15/2017 12/1/2017 9/1/2017 6/1/2017 3/7/2017 5/15/2015 5/14/2015 5/13/2015     140 140 137 140 139     Since admission , serum sodium is 175. He is non verbal   Head CT scan -   There is no evidence of an acute ischemic event.  Previous records from NH- he has no family and is full code.      * No surgery found *      Hospital Course:   Pt is a nursing home resident with Alzheimer's Dementia, bedbound with contractures. Admitted as his sodium level = 175 and chloride > 130. Pt was nonverbal and according to nursing home needs to be an DNR however, there is no family available. Nephrology assisting with care. IV fluids consisted of sterile water with low sodium chloride content. A NGT was placed and free water flushes were prescribed every 6 hours. BMP ordered every 12 hours. Pt was designated an DNR by Marcos Sidhu and Wolfgang. Empiric cefepime given for leukocytosis with no clear source.     3/22- remains weak and non verbal, serum sodium is  now 162.ABG showed PH -7.49 with Pco2- 23, Co2- 11    3/23- he remains non verbal, NG tube was repositioned , serum sodium is now 159    3/24- frail elderly man, cachectic, non verbal, bed bound, getting IVF- sterile water with NaCl-- Na and Cl slowly improving, no caloric intake, will change IVF to D5W and consider starting TF from tomorrow. Pt appears hospice appropriate.  3/25- pt remains non verbal, eyes open, no other response, getting IVF, NGT to LIS, dark biliary kind of NG aspirate, VSS, Afeb, Chest CTA B, CVS S1S2 RRR, Abd soft, BS present but hypoactive. Bun/Cr improving and Na and Cl also improving. IVF changed to D5W with KCl as k is low- 2.9 as well increased free water via NGT.  We have been trying to contact the family for further instructions about discharge/ Hospice but no answer yet. Apparently the Nurse director at the [facility] is now his sole caretaker. His wife has passed away and there is no other family/NOK known. Pt is altered, nonverbal, unable to express any emotions or respond to touch or pain, unable to ask for water or food or fend for himself. D/w Director of NH, they agree with the assessment and understand his terminal condition and will accept him back at [facility] in the morning.   3/26- pt remains the same, labs better but pt remains listless, nonverbal and unresponsive. He was seen and examined and deemed appropriate to be discharged to [facility] at [facility] today.       Consults:   Consults (From admission, onward)        Status Ordering Provider     Inpatient consult to Nephrology  Once     Provider:  (Not yet assigned)    Acknowledged AMY FRENCH     Inpatient consult to Registered Dietitian/Nutritionist  Once     Provider:  (Not yet assigned)    Completed AMY FRENCH     IP consult to case management  Once     Provider:  (Not yet assigned)    Completed AMY FRENCH            Final Active Diagnoses:    Diagnosis Date  Noted POA    Altered mental status [R41.82]  Yes    Dementia [F03.90] 09/10/2014 Yes    Bladder cancer [C67.9] 06/13/2013 Yes      Problems Resolved During this Admission:    Diagnosis Date Noted Date Resolved POA    PRINCIPAL PROBLEM:  Acute hypernatremia [E87.0] 03/20/2019 03/26/2019 Yes    Encephalopathy, metabolic [G93.41] 03/23/2019 03/26/2019 Yes    ARIADNA (acute kidney injury) [N17.9] 03/20/2019 03/26/2019 Yes    Leucocytosis [D72.829] 03/20/2019 03/26/2019 Yes       Discharged Condition: poor    Disposition: Hospice/Medical Facility    Follow Up:  Follow-up Information     Go to Steve Weeks MD.    Specialty:  Pain Medicine  Why:  As needed  Contact information:  [address] [place] [address] [phone]             [facility].    Specialties:  Nursing Home Agency, SNF Agency  Why:  SNF  Contact information:  [address]  [facility] [address] [phone]                 Patient Instructions:      Diet full liquid     Activity as tolerated       Significant Diagnostic Studies: Labs:   BMP:   Recent Labs   Lab 03/25/19  0451 03/25/19  0844 03/26/19  0436   * 152* 107   * 150* 150*   K 2.8* 2.9* 3.9   * 121* 119*   CO2 19* 19* 22*   BUN 65* 59* 52*   CREATININE 2.0* 1.8* 1.7*   CALCIUM 8.3* 8.2* 8.3*   MG  --  2.3  --    , CMP   Recent Labs   Lab 03/25/19  0451 03/25/19  0844 03/26/19  0436   * 150* 150*   K 2.8* 2.9* 3.9   * 121* 119*   CO2 19* 19* 22*   * 152* 107   BUN 65* 59* 52*   CREATININE 2.0* 1.8* 1.7*   CALCIUM 8.3* 8.2* 8.3*   ALBUMIN 2.2*  --  2.0*   ANIONGAP 11 10 9   ESTGFRAFRICA 35* 40* 42*   EGFRNONAA 30* 34* 37*   , CBC   Recent Labs   Lab 03/25/19  0451 03/26/19  0436   WBC 12.30 10.42   HGB 11.3* 11.2*   HCT 36.7* 36.1*   * 139*    and All labs within the past 24 hours have been reviewed  Microbiology:   Blood Culture   Lab Results   Component Value Date    LABBLOO No growth after 5 days. 03/20/2019   ,  Sputum Culture No results found for: GSRESP, RESPIRATORYC and Urine Culture    Lab Results   Component Value Date    LABURIN No growth 03/20/2019       Pending Diagnostic Studies:     None         Medications:  Reconciled Home Medications:      Medication List      STOP taking these medications    AVODART 0.5 mg capsule  Generic drug:  dutasteride     cycloSPORINE 0.05 % ophthalmic emulsion  Commonly known as:  RESTASIS     divalproex 250 MG EC tablet  Commonly known as:  DEPAKOTE     donepezil 5 MG tablet  Commonly known as:  ARICEPT     FEOSOL 45 mg Tab  Generic drug:  iron, carbonyl     FLOMAX 0.4 mg Cap  Generic drug:  tamsulosin     memantine 5 MG Tab  Commonly known as:  NAMENDA     metoprolol succinate 25 MG 24 hr tablet  Commonly known as:  TOPROL-XL     multivitamin Tab     nitroGLYCERIN 0.4 MG SL tablet  Commonly known as:  NITROSTAT     OLANZapine 5 MG tablet  Commonly known as:  ZyPREXA     pravastatin 40 MG tablet  Commonly known as:  PRAVACHOL     simvastatin 40 MG tablet  Commonly known as:  ZOCOR     TYLENOL 325 MG tablet  Generic drug:  acetaminophen     venlafaxine 75 MG tablet  Commonly known as:  EFFEXOR            Indwelling Lines/Drains at time of discharge:   Lines/Drains/Airways     Drain                 NG/OG Tube 03/23/19 0915 [place] sump Left nostril 3 days                Time spent on the discharge of patient: 43 minutes  Patient was seen and examined on the date of discharge and determined to be suitable for discharge.         Rosemary Thompson MD  Department of Hospital Medicine  [facility]

## 2019-03-26 NOTE — NURSING
Pt transported to [facility] by [facility]. Let facility no distress noted. No concerns at this time

## 2019-03-26 NOTE — ASSESSMENT & PLAN NOTE
82 y/o male with change in MS and hypernatremia:           * Acute hypernatremia     Severe hypernatremia due to dehydration  s Na much improved.  OK to reduce rate if IV hypotonic solutions.  Continue enteric water  Mgmt reviewed  Decrease rate of D5W IVF's from 75 to 50 ml/hr  Continue water by NG tube at 450 ml q 6 hours.     Hyponatremia: due to the resolution phase of ARIADNA and to receiving IVF's  K being replaced      ARIADNA (acute kidney injury)     ARIADNA. Due to dehydration.  Baseline s Cr not known.  s Cr continues to improve daily  ARIADNA resolving  May have CKD. Stage not known. Repeat labs will clarify  Metabolic acidosis, signifies likely CKD  On Na bicarbonate by OG tube       Dementia     Baseline dementia  Baseline MS not known  Presented with change in mental status, likely due to severe hypernatremia         Plans and recommendations:   As detailed above

## 2019-03-26 NOTE — PROGRESS NOTES
"[facility]   Nephrology  Progress Note    Patient Name: David Montero  MRN: [id number]  Admission Date: 3/20/2019  Hospital Length of Stay: 6 days  Attending Provider: Rosemary Thompson MD   Primary Care Physician: Steve Weeks MD  Principal Problem:Hypernatremia    Subjective:     HPI: 83 year old male with dementia, CAD, h/o bladder cancer, HTN, hyperlipidemia, BPH, femoral artery aneurysm, AAA, GERD, s/p CVA, chronic UTI presents to [facility] with "altered mental status". Patient was transferred from [facility]. Work-up revealed hypernatremia (Na 172) and ARIADNA (creatinine has increased from 1.1 on 3/6/19 to 3.1 on 3/20/19). Also with leucocytosis (WBC 14.2). He received 2 liters of IV fluids in ER.   Nephrology was consulted to help with patient's renal and electrolyte care. Patient was seen and examined in his hospital room. Patient is demented and currently nonverbal. He is not able to provide any additional history.   Chart review revealed that patient is seen at [facility]. Labs from 3/6/19 revealed Na of 140, K-5.3, CO2 of 18, creatinine of 1.1, Calcium of 9.2, WBC of 6.1, Hgb of 14.3, platelets of 262. Urinalysis from 2/26/19 showed 100 protein, > 100 WBC, 5-10 RBC, + LE. ECHO from 3/2/19 revealed EF of 48%.     Interval History: Pt was seen and examined. No new events, no changes overall. Remains bed bound and nonverbal.    Review of patient's allergies indicates:   Allergen Reactions    Augmentin [amoxicillin-pot clavulanate]      Tolerates cephalosporins     Current Facility-Administered Medications   Medication Frequency    acetaminophen suppository 650 mg Q6H PRN    acetaminophen tablet 650 mg Q4H PRN    cefepime 2 g in dextrose 5% 50 mL IVPB (ready to mix system) Q24H    dextrose 5 % 1,000 mL with potassium chloride 40 mEq infusion Continuous    divalproex capsule 500 mg Daily    enoxaparin injection 30 mg Daily    influenza (FLUZONE HIGH-DOSE) vaccine 0.5 mL vaccine x 1 dose    " memantine tablet 5 mg BID    metoprolol tartrate (LOPRESSOR) tablet 25 mg BID    OLANZapine tablet 5 mg QHS    pneumoc 13-konstantin conj-dip cr(PF) (PREVNAR 13 (PF)) 0.5 mL vaccine x 1 dose    potassium chloride 10% oral solution 20 mEq TID    potassium, sodium phosphates 280-160-250 mg packet 2 packet QID (AC & HS)    sodium bicarbonate tablet 650 mg TID    tamsulosin 24 hr capsule 0.4 mg Daily    venlafaxine tablet 150 mg Daily       Objective:     Vital Signs (Most Recent):  Temp: 99.2 °F (37.3 °C) (03/26/19 0728)  Pulse: 95 (03/26/19 0728)  Resp: 20 (03/26/19 0728)  BP: (!) 113/56 (03/26/19 0728)  SpO2: (!) 94 % (03/26/19 0728)  O2 Device (Oxygen Therapy): room air (03/26/19 0728) Vital Signs (24h Range):  Temp:  [97.8 °F (36.6 °C)-99.6 °F (37.6 °C)] 99.2 °F (37.3 °C)  Pulse:  [55-95] 95  Resp:  [18-32] 20  SpO2:  [94 %-96 %] 94 %  BP: (102-121)/(53-58) 113/56     Weight: 60 kg (132 lb 4.4 oz) (03/22/19 0356)  Body mass index is 20.72 kg/m².  Body surface area is 1.68 meters squared.    I/O last 3 completed shifts:  In: 3941.7 [I.V.:2941.7; NG/GT:800; IV Piggyback:200]  Out: -     Physical Exam   Constitutional: He is oriented to person, place, and time. He appears well-developed.   Looks thin and malnourished   HENT:   Head: Normocephalic and atraumatic.   Neck: No JVD present.   Cardiovascular: Normal rate, regular rhythm and normal heart sounds. Exam reveals no friction rub.   Pulmonary/Chest: Effort normal and breath sounds normal. No respiratory distress. He has no rales.   Abdominal: Soft. Bowel sounds are normal. He exhibits no distension. There is no guarding.   Musculoskeletal: He exhibits no edema.   Neurological: He is alert and oriented to person, place, and time.   Skin: Skin is warm and dry.   Psychiatric: He has a normal mood and affect. His behavior is normal.   Nursing note and vitals reviewed.      Significant Labs: reviewed  BMP  Lab Results   Component Value Date     (H) 03/26/2019     K 3.9 03/26/2019     (H) 03/26/2019    CO2 22 (L) 03/26/2019    BUN 52 (H) 03/26/2019    CREATININE 1.7 (H) 03/26/2019    CALCIUM 8.3 (L) 03/26/2019    ANIONGAP 9 03/26/2019    ESTGFRAFRICA 42 (A) 03/26/2019    EGFRNONAA 37 (A) 03/26/2019         Assessment/Plan:     84 y/o male with change in MS and hypernatremia:           * Acute hypernatremia     Severe hypernatremia due to dehydration  s Na much improved overall, though not since yesterday  Noted plans to d/c back to NH  Recommend continuous hypotonic IVF's infusion with D5W at rate of 125 ml/min.  Continue enteric water  Continue water by NG tube at 450 ml q 6 hours.     Hypokalemia: due to the resolution phase of ARIADNA and to receiving IVF's  K being replaced. K normal today      ARIADNA (acute kidney injury)     ARIADNA. Due to dehydration.  Baseline s Cr not known.  s Cr continues to improve daily, lower still today  ARIADNA resolving  May have CKD. Stage not known. Repeat labs will clarify  Metabolic acidosis, signifies likely CKD  On Na bicarbonate by OG tube       Dementia     Baseline dementia  Baseline MS not known  Presented with change in mental status, likely due to severe hypernatremia         Plans and recommendations:   As detailed above          Rommel Tellez MD  Nephrology  [facility]

## 2019-03-26 NOTE — SUBJECTIVE & OBJECTIVE
Interval History: Pt was seen and examined. No new events, no changes overall. Remains bed bound and nonverbal.    Review of patient's allergies indicates:   Allergen Reactions    Augmentin [amoxicillin-pot clavulanate]      Tolerates cephalosporins     Current Facility-Administered Medications   Medication Frequency    acetaminophen suppository 650 mg Q6H PRN    acetaminophen tablet 650 mg Q4H PRN    cefepime 2 g in dextrose 5% 50 mL IVPB (ready to mix system) Q24H    dextrose 5 % 1,000 mL with potassium chloride 40 mEq infusion Continuous    divalproex capsule 500 mg Daily    enoxaparin injection 30 mg Daily    influenza (FLUZONE HIGH-DOSE) vaccine 0.5 mL vaccine x 1 dose    memantine tablet 5 mg BID    metoprolol tartrate (LOPRESSOR) tablet 25 mg BID    OLANZapine tablet 5 mg QHS    pneumoc 13-konstantin conj-dip cr(PF) (PREVNAR 13 (PF)) 0.5 mL vaccine x 1 dose    potassium chloride 10% oral solution 20 mEq TID    potassium, sodium phosphates 280-160-250 mg packet 2 packet QID (AC & HS)    sodium bicarbonate tablet 650 mg TID    tamsulosin 24 hr capsule 0.4 mg Daily    venlafaxine tablet 150 mg Daily       Objective:     Vital Signs (Most Recent):  Temp: 99.2 °F (37.3 °C) (03/26/19 0728)  Pulse: 95 (03/26/19 0728)  Resp: 20 (03/26/19 0728)  BP: (!) 113/56 (03/26/19 0728)  SpO2: (!) 94 % (03/26/19 0728)  O2 Device (Oxygen Therapy): room air (03/26/19 0728) Vital Signs (24h Range):  Temp:  [97.8 °F (36.6 °C)-99.6 °F (37.6 °C)] 99.2 °F (37.3 °C)  Pulse:  [55-95] 95  Resp:  [18-32] 20  SpO2:  [94 %-96 %] 94 %  BP: (102-121)/(53-58) 113/56     Weight: 60 kg (132 lb 4.4 oz) (03/22/19 0356)  Body mass index is 20.72 kg/m².  Body surface area is 1.68 meters squared.    I/O last 3 completed shifts:  In: 3941.7 [I.V.:2941.7; NG/GT:800; IV Piggyback:200]  Out: -     Physical Exam   Constitutional: He is oriented to person, place, and time. He appears well-developed.   Looks thin and malnourished   HENT:   Head:  Normocephalic and atraumatic.   Neck: No JVD present.   Cardiovascular: Normal rate, regular rhythm and normal heart sounds. Exam reveals no friction rub.   Pulmonary/Chest: Effort normal and breath sounds normal. No respiratory distress. He has no rales.   Abdominal: Soft. Bowel sounds are normal. He exhibits no distension. There is no guarding.   Musculoskeletal: He exhibits no edema.   Neurological: He is alert and oriented to person, place, and time.   Skin: Skin is warm and dry.   Psychiatric: He has a normal mood and affect. His behavior is normal.   Nursing note and vitals reviewed.      Significant Labs: reviewed  BMP  Lab Results   Component Value Date     (H) 03/26/2019    K 3.9 03/26/2019     (H) 03/26/2019    CO2 22 (L) 03/26/2019    BUN 52 (H) 03/26/2019    CREATININE 1.7 (H) 03/26/2019    CALCIUM 8.3 (L) 03/26/2019    ANIONGAP 9 03/26/2019    ESTGFRAFRICA 42 (A) 03/26/2019    EGFRNONAA 37 (A) 03/26/2019

## 2019-03-26 NOTE — NURSING
Checked residual at 0300. Residual of 350 ccs. Tube feeding to remain on hold. Will continue to monitor.

## 2019-03-26 NOTE — NURSING
Saw order for pt to have started continuous tube feeding via pump. Pump set up with initial rate of 10 cc/hr (to be gradually increased to 40 ccs per order). Holding tube feed at this time d/t residual >200 ccs. Will check again around 0000.